# Patient Record
Sex: MALE | Race: WHITE | NOT HISPANIC OR LATINO | Employment: FULL TIME | URBAN - METROPOLITAN AREA
[De-identification: names, ages, dates, MRNs, and addresses within clinical notes are randomized per-mention and may not be internally consistent; named-entity substitution may affect disease eponyms.]

---

## 2018-06-08 ENCOUNTER — APPOINTMENT (OUTPATIENT)
Dept: CARDIOLOGY | Facility: CLINIC | Age: 43
DRG: 247 | End: 2018-06-08
Attending: EMERGENCY MEDICINE
Payer: COMMERCIAL

## 2018-06-08 ENCOUNTER — APPOINTMENT (OUTPATIENT)
Dept: GENERAL RADIOLOGY | Facility: CLINIC | Age: 43
DRG: 247 | End: 2018-06-08
Attending: EMERGENCY MEDICINE
Payer: COMMERCIAL

## 2018-06-08 ENCOUNTER — HOSPITAL ENCOUNTER (INPATIENT)
Facility: CLINIC | Age: 43
LOS: 4 days | Discharge: HOME | DRG: 247 | End: 2018-06-12
Attending: EMERGENCY MEDICINE | Admitting: INTERNAL MEDICINE
Payer: COMMERCIAL

## 2018-06-08 ENCOUNTER — APPOINTMENT (OUTPATIENT)
Dept: CARDIOLOGY | Facility: CLINIC | Age: 43
DRG: 247 | End: 2018-06-08
Attending: INTERNAL MEDICINE
Payer: COMMERCIAL

## 2018-06-08 DIAGNOSIS — I21.4 NSTEMI (NON-ST ELEVATED MYOCARDIAL INFARCTION) (H): ICD-10-CM

## 2018-06-08 DIAGNOSIS — I25.10 CAD (CORONARY ARTERY DISEASE): ICD-10-CM

## 2018-06-08 DIAGNOSIS — I21.02 ACUTE ST ELEVATION MYOCARDIAL INFARCTION (STEMI) INVOLVING LEFT ANTERIOR DESCENDING (LAD) CORONARY ARTERY (H): Primary | ICD-10-CM

## 2018-06-08 PROBLEM — I21.9 AMI (ACUTE MYOCARDIAL INFARCTION) (H): Status: ACTIVE | Noted: 2018-06-08

## 2018-06-08 LAB
ANION GAP SERPL CALCULATED.3IONS-SCNC: 4 MMOL/L (ref 3–14)
APTT PPP: 30 SEC (ref 22–37)
BASOPHILS # BLD AUTO: 0 10E9/L (ref 0–0.2)
BASOPHILS NFR BLD AUTO: 0.4 %
BUN SERPL-MCNC: 10 MG/DL (ref 7–30)
CALCIUM SERPL-MCNC: 9 MG/DL (ref 8.5–10.1)
CHLORIDE SERPL-SCNC: 108 MMOL/L (ref 94–109)
CO2 SERPL-SCNC: 29 MMOL/L (ref 20–32)
CREAT SERPL-MCNC: 0.86 MG/DL (ref 0.66–1.25)
DIFFERENTIAL METHOD BLD: NORMAL
EOSINOPHIL # BLD AUTO: 0.1 10E9/L (ref 0–0.7)
EOSINOPHIL NFR BLD AUTO: 1 %
ERYTHROCYTE [DISTWIDTH] IN BLOOD BY AUTOMATED COUNT: 12.1 % (ref 10–15)
GFR SERPL CREATININE-BSD FRML MDRD: >90 ML/MIN/1.7M2
GLUCOSE SERPL-MCNC: 90 MG/DL (ref 70–99)
HCT VFR BLD AUTO: 46.2 % (ref 40–53)
HGB BLD-MCNC: 16 G/DL (ref 13.3–17.7)
IMM GRANULOCYTES # BLD: 0 10E9/L (ref 0–0.4)
IMM GRANULOCYTES NFR BLD: 0.4 %
INR PPP: 0.92 (ref 0.86–1.14)
INTERPRETATION ECG - MUSE: NORMAL
INTERPRETATION ECG - MUSE: NORMAL
LYMPHOCYTES # BLD AUTO: 1.6 10E9/L (ref 0.8–5.3)
LYMPHOCYTES NFR BLD AUTO: 22.2 %
MCH RBC QN AUTO: 31.9 PG (ref 26.5–33)
MCHC RBC AUTO-ENTMCNC: 34.6 G/DL (ref 31.5–36.5)
MCV RBC AUTO: 92 FL (ref 78–100)
MONOCYTES # BLD AUTO: 0.5 10E9/L (ref 0–1.3)
MONOCYTES NFR BLD AUTO: 7.4 %
NEUTROPHILS # BLD AUTO: 4.8 10E9/L (ref 1.6–8.3)
NEUTROPHILS NFR BLD AUTO: 68.6 %
NRBC # BLD AUTO: 0 10*3/UL
NRBC BLD AUTO-RTO: 0 /100
PLATELET # BLD AUTO: 215 10E9/L (ref 150–450)
POTASSIUM SERPL-SCNC: 3.7 MMOL/L (ref 3.4–5.3)
RBC # BLD AUTO: 5.01 10E12/L (ref 4.4–5.9)
SODIUM SERPL-SCNC: 141 MMOL/L (ref 133–144)
TROPONIN I SERPL-MCNC: 0.07 UG/L (ref 0–0.04)
WBC # BLD AUTO: 7 10E9/L (ref 4–11)

## 2018-06-08 PROCEDURE — 93571 IV DOP VEL&/PRESS C FLO 1ST: CPT

## 2018-06-08 PROCEDURE — 85610 PROTHROMBIN TIME: CPT | Performed by: EMERGENCY MEDICINE

## 2018-06-08 PROCEDURE — 71046 X-RAY EXAM CHEST 2 VIEWS: CPT

## 2018-06-08 PROCEDURE — 40000264 ECHO COMPLETE WITH OPTISON

## 2018-06-08 PROCEDURE — 4A033BC MEASUREMENT OF ARTERIAL PRESSURE, CORONARY, PERCUTANEOUS APPROACH: ICD-10-PCS | Performed by: INTERNAL MEDICINE

## 2018-06-08 PROCEDURE — C1894 INTRO/SHEATH, NON-LASER: HCPCS

## 2018-06-08 PROCEDURE — 99223 1ST HOSP IP/OBS HIGH 75: CPT | Mod: AI | Performed by: INTERNAL MEDICINE

## 2018-06-08 PROCEDURE — C1769 GUIDE WIRE: HCPCS

## 2018-06-08 PROCEDURE — 27210856 ZZH ACCESS HEART CATH CR2

## 2018-06-08 PROCEDURE — 99152 MOD SED SAME PHYS/QHP 5/>YRS: CPT

## 2018-06-08 PROCEDURE — 99291 CRITICAL CARE FIRST HOUR: CPT | Mod: 25 | Performed by: INTERNAL MEDICINE

## 2018-06-08 PROCEDURE — 25000128 H RX IP 250 OP 636: Performed by: EMERGENCY MEDICINE

## 2018-06-08 PROCEDURE — 25000132 ZZH RX MED GY IP 250 OP 250 PS 637: Performed by: INTERNAL MEDICINE

## 2018-06-08 PROCEDURE — 96366 THER/PROPH/DIAG IV INF ADDON: CPT

## 2018-06-08 PROCEDURE — 27210827 ZZH KIT ACIST INJECTOR CR6

## 2018-06-08 PROCEDURE — 25000125 ZZHC RX 250: Performed by: INTERNAL MEDICINE

## 2018-06-08 PROCEDURE — 93306 TTE W/DOPPLER COMPLETE: CPT | Mod: 26 | Performed by: INTERNAL MEDICINE

## 2018-06-08 PROCEDURE — 93005 ELECTROCARDIOGRAM TRACING: CPT

## 2018-06-08 PROCEDURE — 12000007 ZZH R&B INTERMEDIATE

## 2018-06-08 PROCEDURE — 27210742 ZZH CATH CR1

## 2018-06-08 PROCEDURE — 93571 IV DOP VEL&/PRESS C FLO 1ST: CPT | Mod: 26 | Performed by: INTERNAL MEDICINE

## 2018-06-08 PROCEDURE — 85025 COMPLETE CBC W/AUTO DIFF WBC: CPT | Performed by: EMERGENCY MEDICINE

## 2018-06-08 PROCEDURE — 84484 ASSAY OF TROPONIN QUANT: CPT | Performed by: EMERGENCY MEDICINE

## 2018-06-08 PROCEDURE — 80048 BASIC METABOLIC PNL TOTAL CA: CPT | Performed by: EMERGENCY MEDICINE

## 2018-06-08 PROCEDURE — 25000128 H RX IP 250 OP 636: Performed by: INTERNAL MEDICINE

## 2018-06-08 PROCEDURE — 99285 EMERGENCY DEPT VISIT HI MDM: CPT | Mod: 25

## 2018-06-08 PROCEDURE — 12000000 ZZH R&B MED SURG/OB

## 2018-06-08 PROCEDURE — C1887 CATHETER, GUIDING: HCPCS

## 2018-06-08 PROCEDURE — 85730 THROMBOPLASTIN TIME PARTIAL: CPT | Performed by: EMERGENCY MEDICINE

## 2018-06-08 PROCEDURE — B2111ZZ FLUOROSCOPY OF MULTIPLE CORONARY ARTERIES USING LOW OSMOLAR CONTRAST: ICD-10-PCS | Performed by: INTERNAL MEDICINE

## 2018-06-08 PROCEDURE — 99152 MOD SED SAME PHYS/QHP 5/>YRS: CPT | Performed by: INTERNAL MEDICINE

## 2018-06-08 PROCEDURE — 93458 L HRT ARTERY/VENTRICLE ANGIO: CPT | Mod: 26 | Performed by: INTERNAL MEDICINE

## 2018-06-08 PROCEDURE — 96365 THER/PROPH/DIAG IV INF INIT: CPT

## 2018-06-08 PROCEDURE — 4A023N7 MEASUREMENT OF CARDIAC SAMPLING AND PRESSURE, LEFT HEART, PERCUTANEOUS APPROACH: ICD-10-PCS | Performed by: INTERNAL MEDICINE

## 2018-06-08 PROCEDURE — 27210946 ZZH KIT HC TOTES DISP CR8

## 2018-06-08 PROCEDURE — 25500064 ZZH RX 255 OP 636: Performed by: EMERGENCY MEDICINE

## 2018-06-08 PROCEDURE — 93458 L HRT ARTERY/VENTRICLE ANGIO: CPT

## 2018-06-08 PROCEDURE — 93005 ELECTROCARDIOGRAM TRACING: CPT | Mod: 76

## 2018-06-08 PROCEDURE — 27210998 ZZH ACCESS HEART CATH CR6

## 2018-06-08 PROCEDURE — B2151ZZ FLUOROSCOPY OF LEFT HEART USING LOW OSMOLAR CONTRAST: ICD-10-PCS | Performed by: INTERNAL MEDICINE

## 2018-06-08 PROCEDURE — 99153 MOD SED SAME PHYS/QHP EA: CPT

## 2018-06-08 RX ORDER — PROTAMINE SULFATE 10 MG/ML
1-5 INJECTION, SOLUTION INTRAVENOUS
Status: DISCONTINUED | OUTPATIENT
Start: 2018-06-08 | End: 2018-06-08 | Stop reason: HOSPADM

## 2018-06-08 RX ORDER — SODIUM NITROPRUSSIDE 25 MG/ML
100-200 INJECTION INTRAVENOUS
Status: DISCONTINUED | OUTPATIENT
Start: 2018-06-08 | End: 2018-06-08 | Stop reason: HOSPADM

## 2018-06-08 RX ORDER — CLOPIDOGREL BISULFATE 75 MG/1
75 TABLET ORAL DAILY
Status: DISCONTINUED | OUTPATIENT
Start: 2018-06-09 | End: 2018-06-12 | Stop reason: HOSPADM

## 2018-06-08 RX ORDER — FENTANYL CITRATE 50 UG/ML
INJECTION, SOLUTION INTRAMUSCULAR; INTRAVENOUS
Status: DISCONTINUED
Start: 2018-06-08 | End: 2018-06-08 | Stop reason: HOSPADM

## 2018-06-08 RX ORDER — PROTAMINE SULFATE 10 MG/ML
25-100 INJECTION, SOLUTION INTRAVENOUS EVERY 5 MIN PRN
Status: DISCONTINUED | OUTPATIENT
Start: 2018-06-08 | End: 2018-06-08 | Stop reason: HOSPADM

## 2018-06-08 RX ORDER — NALOXONE HYDROCHLORIDE 0.4 MG/ML
.2-.4 INJECTION, SOLUTION INTRAMUSCULAR; INTRAVENOUS; SUBCUTANEOUS
Status: DISCONTINUED | OUTPATIENT
Start: 2018-06-08 | End: 2018-06-08

## 2018-06-08 RX ORDER — ATROPINE SULFATE 0.1 MG/ML
0.5 INJECTION INTRAVENOUS EVERY 5 MIN PRN
Status: DISCONTINUED | OUTPATIENT
Start: 2018-06-08 | End: 2018-06-08

## 2018-06-08 RX ORDER — ADENOSINE 3 MG/ML
12-12000 INJECTION, SOLUTION INTRAVENOUS
Status: DISCONTINUED | OUTPATIENT
Start: 2018-06-08 | End: 2018-06-08 | Stop reason: HOSPADM

## 2018-06-08 RX ORDER — CLOPIDOGREL BISULFATE 75 MG/1
75 TABLET ORAL
Status: DISCONTINUED | OUTPATIENT
Start: 2018-06-08 | End: 2018-06-08 | Stop reason: HOSPADM

## 2018-06-08 RX ORDER — ENALAPRILAT 1.25 MG/ML
1.25-2.5 INJECTION INTRAVENOUS
Status: DISCONTINUED | OUTPATIENT
Start: 2018-06-08 | End: 2018-06-08 | Stop reason: HOSPADM

## 2018-06-08 RX ORDER — ASPIRIN 81 MG/1
81 TABLET ORAL DAILY
Status: DISCONTINUED | OUTPATIENT
Start: 2018-06-09 | End: 2018-06-11 | Stop reason: ALTCHOICE

## 2018-06-08 RX ORDER — CLOPIDOGREL 300 MG/1
300-600 TABLET, FILM COATED ORAL
Status: COMPLETED | OUTPATIENT
Start: 2018-06-08 | End: 2018-06-08

## 2018-06-08 RX ORDER — NIFEDIPINE 10 MG/1
10 CAPSULE ORAL
Status: DISCONTINUED | OUTPATIENT
Start: 2018-06-08 | End: 2018-06-08 | Stop reason: HOSPADM

## 2018-06-08 RX ORDER — ONDANSETRON 2 MG/ML
4 INJECTION INTRAMUSCULAR; INTRAVENOUS EVERY 4 HOURS PRN
Status: DISCONTINUED | OUTPATIENT
Start: 2018-06-08 | End: 2018-06-08 | Stop reason: HOSPADM

## 2018-06-08 RX ORDER — LIDOCAINE 40 MG/G
CREAM TOPICAL
Status: DISCONTINUED | OUTPATIENT
Start: 2018-06-08 | End: 2018-06-08

## 2018-06-08 RX ORDER — DIPHENHYDRAMINE HYDROCHLORIDE 50 MG/ML
25-50 INJECTION INTRAMUSCULAR; INTRAVENOUS
Status: DISCONTINUED | OUTPATIENT
Start: 2018-06-08 | End: 2018-06-08 | Stop reason: HOSPADM

## 2018-06-08 RX ORDER — FUROSEMIDE 10 MG/ML
20-100 INJECTION INTRAMUSCULAR; INTRAVENOUS
Status: DISCONTINUED | OUTPATIENT
Start: 2018-06-08 | End: 2018-06-08 | Stop reason: HOSPADM

## 2018-06-08 RX ORDER — HYDROCODONE BITARTRATE AND ACETAMINOPHEN 5; 325 MG/1; MG/1
1-2 TABLET ORAL EVERY 4 HOURS PRN
Status: DISCONTINUED | OUTPATIENT
Start: 2018-06-08 | End: 2018-06-11

## 2018-06-08 RX ORDER — DOBUTAMINE HYDROCHLORIDE 200 MG/100ML
2-20 INJECTION INTRAVENOUS CONTINUOUS PRN
Status: DISCONTINUED | OUTPATIENT
Start: 2018-06-08 | End: 2018-06-08 | Stop reason: HOSPADM

## 2018-06-08 RX ORDER — ACETAMINOPHEN 325 MG/1
325-650 TABLET ORAL EVERY 4 HOURS PRN
Status: DISCONTINUED | OUTPATIENT
Start: 2018-06-08 | End: 2018-06-11

## 2018-06-08 RX ORDER — NITROGLYCERIN 5 MG/ML
VIAL (ML) INTRAVENOUS
Status: DISCONTINUED
Start: 2018-06-08 | End: 2018-06-08 | Stop reason: HOSPADM

## 2018-06-08 RX ORDER — POTASSIUM CHLORIDE 7.45 MG/ML
10 INJECTION INTRAVENOUS
Status: DISCONTINUED | OUTPATIENT
Start: 2018-06-08 | End: 2018-06-08 | Stop reason: HOSPADM

## 2018-06-08 RX ORDER — NALOXONE HYDROCHLORIDE 0.4 MG/ML
0.4 INJECTION, SOLUTION INTRAMUSCULAR; INTRAVENOUS; SUBCUTANEOUS EVERY 5 MIN PRN
Status: DISCONTINUED | OUTPATIENT
Start: 2018-06-08 | End: 2018-06-08 | Stop reason: HOSPADM

## 2018-06-08 RX ORDER — METOPROLOL TARTRATE 25 MG/1
25 TABLET, FILM COATED ORAL 2 TIMES DAILY
Status: DISCONTINUED | OUTPATIENT
Start: 2018-06-08 | End: 2018-06-09

## 2018-06-08 RX ORDER — NICARDIPINE HYDROCHLORIDE 2.5 MG/ML
100 INJECTION INTRAVENOUS
Status: DISCONTINUED | OUTPATIENT
Start: 2018-06-08 | End: 2018-06-08 | Stop reason: HOSPADM

## 2018-06-08 RX ORDER — PHENYLEPHRINE HCL IN 0.9% NACL 1 MG/10 ML
20-100 SYRINGE (ML) INTRAVENOUS
Status: DISCONTINUED | OUTPATIENT
Start: 2018-06-08 | End: 2018-06-08 | Stop reason: HOSPADM

## 2018-06-08 RX ORDER — SODIUM CHLORIDE 9 MG/ML
INJECTION, SOLUTION INTRAVENOUS CONTINUOUS
Status: DISCONTINUED | OUTPATIENT
Start: 2018-06-08 | End: 2018-06-09

## 2018-06-08 RX ORDER — VERAPAMIL HYDROCHLORIDE 2.5 MG/ML
INJECTION, SOLUTION INTRAVENOUS
Status: DISCONTINUED
Start: 2018-06-08 | End: 2018-06-08 | Stop reason: HOSPADM

## 2018-06-08 RX ORDER — NITROGLYCERIN 20 MG/100ML
.07-2 INJECTION INTRAVENOUS CONTINUOUS PRN
Status: DISCONTINUED | OUTPATIENT
Start: 2018-06-08 | End: 2018-06-08 | Stop reason: HOSPADM

## 2018-06-08 RX ORDER — FLUMAZENIL 0.1 MG/ML
0.2 INJECTION, SOLUTION INTRAVENOUS
Status: DISCONTINUED | OUTPATIENT
Start: 2018-06-08 | End: 2018-06-08 | Stop reason: HOSPADM

## 2018-06-08 RX ORDER — EPINEPHRINE 1 MG/ML
0.3 INJECTION, SOLUTION, CONCENTRATE INTRAVENOUS
Status: DISCONTINUED | OUTPATIENT
Start: 2018-06-08 | End: 2018-06-08 | Stop reason: HOSPADM

## 2018-06-08 RX ORDER — LORAZEPAM 2 MG/ML
.5-2 INJECTION INTRAMUSCULAR EVERY 4 HOURS PRN
Status: DISCONTINUED | OUTPATIENT
Start: 2018-06-08 | End: 2018-06-08 | Stop reason: HOSPADM

## 2018-06-08 RX ORDER — POTASSIUM CHLORIDE 29.8 MG/ML
20 INJECTION INTRAVENOUS
Status: DISCONTINUED | OUTPATIENT
Start: 2018-06-08 | End: 2018-06-08 | Stop reason: HOSPADM

## 2018-06-08 RX ORDER — MORPHINE SULFATE 2 MG/ML
1-2 INJECTION, SOLUTION INTRAMUSCULAR; INTRAVENOUS EVERY 5 MIN PRN
Status: DISCONTINUED | OUTPATIENT
Start: 2018-06-08 | End: 2018-06-08 | Stop reason: HOSPADM

## 2018-06-08 RX ORDER — HYDRALAZINE HYDROCHLORIDE 20 MG/ML
10-20 INJECTION INTRAMUSCULAR; INTRAVENOUS
Status: DISCONTINUED | OUTPATIENT
Start: 2018-06-08 | End: 2018-06-08 | Stop reason: HOSPADM

## 2018-06-08 RX ORDER — METOPROLOL TARTRATE 1 MG/ML
5 INJECTION, SOLUTION INTRAVENOUS EVERY 5 MIN PRN
Status: DISCONTINUED | OUTPATIENT
Start: 2018-06-08 | End: 2018-06-08 | Stop reason: HOSPADM

## 2018-06-08 RX ORDER — LISINOPRIL 2.5 MG/1
2.5 TABLET ORAL DAILY
Status: DISCONTINUED | OUTPATIENT
Start: 2018-06-08 | End: 2018-06-09

## 2018-06-08 RX ORDER — ATORVASTATIN CALCIUM 40 MG/1
40 TABLET, FILM COATED ORAL DAILY
Status: DISCONTINUED | OUTPATIENT
Start: 2018-06-08 | End: 2018-06-12 | Stop reason: HOSPADM

## 2018-06-08 RX ORDER — ATROPINE SULFATE 0.1 MG/ML
.5-1 INJECTION INTRAVENOUS
Status: DISCONTINUED | OUTPATIENT
Start: 2018-06-08 | End: 2018-06-08 | Stop reason: HOSPADM

## 2018-06-08 RX ORDER — HEPARIN SODIUM 1000 [USP'U]/ML
1000-10000 INJECTION, SOLUTION INTRAVENOUS; SUBCUTANEOUS EVERY 5 MIN PRN
Status: DISCONTINUED | OUTPATIENT
Start: 2018-06-08 | End: 2018-06-08 | Stop reason: HOSPADM

## 2018-06-08 RX ORDER — LIDOCAINE HYDROCHLORIDE 10 MG/ML
1-10 INJECTION, SOLUTION EPIDURAL; INFILTRATION; INTRACAUDAL; PERINEURAL
Status: COMPLETED | OUTPATIENT
Start: 2018-06-08 | End: 2018-06-08

## 2018-06-08 RX ORDER — DEXTROSE MONOHYDRATE 25 G/50ML
12.5-5 INJECTION, SOLUTION INTRAVENOUS EVERY 30 MIN PRN
Status: DISCONTINUED | OUTPATIENT
Start: 2018-06-08 | End: 2018-06-08 | Stop reason: HOSPADM

## 2018-06-08 RX ORDER — NALOXONE HYDROCHLORIDE 0.4 MG/ML
.1-.4 INJECTION, SOLUTION INTRAMUSCULAR; INTRAVENOUS; SUBCUTANEOUS
Status: DISCONTINUED | OUTPATIENT
Start: 2018-06-08 | End: 2018-06-11

## 2018-06-08 RX ORDER — DOPAMINE HYDROCHLORIDE 160 MG/100ML
2-20 INJECTION, SOLUTION INTRAVENOUS CONTINUOUS PRN
Status: DISCONTINUED | OUTPATIENT
Start: 2018-06-08 | End: 2018-06-08 | Stop reason: HOSPADM

## 2018-06-08 RX ORDER — LIDOCAINE HYDROCHLORIDE 10 MG/ML
INJECTION, SOLUTION EPIDURAL; INFILTRATION; INTRACAUDAL; PERINEURAL
Status: DISCONTINUED
Start: 2018-06-08 | End: 2018-06-08 | Stop reason: HOSPADM

## 2018-06-08 RX ORDER — METHYLPREDNISOLONE SODIUM SUCCINATE 125 MG/2ML
125 INJECTION, POWDER, LYOPHILIZED, FOR SOLUTION INTRAMUSCULAR; INTRAVENOUS
Status: DISCONTINUED | OUTPATIENT
Start: 2018-06-08 | End: 2018-06-08 | Stop reason: HOSPADM

## 2018-06-08 RX ORDER — LIDOCAINE HYDROCHLORIDE 10 MG/ML
30 INJECTION, SOLUTION EPIDURAL; INFILTRATION; INTRACAUDAL; PERINEURAL
Status: DISCONTINUED | OUTPATIENT
Start: 2018-06-08 | End: 2018-06-08 | Stop reason: HOSPADM

## 2018-06-08 RX ORDER — FENTANYL CITRATE 50 UG/ML
25-50 INJECTION, SOLUTION INTRAMUSCULAR; INTRAVENOUS
Status: DISCONTINUED | OUTPATIENT
Start: 2018-06-08 | End: 2018-06-08

## 2018-06-08 RX ORDER — BUPIVACAINE HYDROCHLORIDE 2.5 MG/ML
1-10 INJECTION, SOLUTION EPIDURAL; INFILTRATION; INTRACAUDAL
Status: DISCONTINUED | OUTPATIENT
Start: 2018-06-08 | End: 2018-06-08 | Stop reason: HOSPADM

## 2018-06-08 RX ORDER — VERAPAMIL HYDROCHLORIDE 2.5 MG/ML
1-2.5 INJECTION, SOLUTION INTRAVENOUS
Status: COMPLETED | OUTPATIENT
Start: 2018-06-08 | End: 2018-06-08

## 2018-06-08 RX ORDER — NITROGLYCERIN 5 MG/ML
100-500 VIAL (ML) INTRAVENOUS
Status: COMPLETED | OUTPATIENT
Start: 2018-06-08 | End: 2018-06-08

## 2018-06-08 RX ORDER — PRASUGREL 10 MG/1
10-60 TABLET, FILM COATED ORAL
Status: DISCONTINUED | OUTPATIENT
Start: 2018-06-08 | End: 2018-06-08 | Stop reason: HOSPADM

## 2018-06-08 RX ORDER — FENTANYL CITRATE 50 UG/ML
25-50 INJECTION, SOLUTION INTRAMUSCULAR; INTRAVENOUS
Status: DISCONTINUED | OUTPATIENT
Start: 2018-06-08 | End: 2018-06-08 | Stop reason: HOSPADM

## 2018-06-08 RX ORDER — FLUMAZENIL 0.1 MG/ML
0.2 INJECTION, SOLUTION INTRAVENOUS
Status: DISCONTINUED | OUTPATIENT
Start: 2018-06-08 | End: 2018-06-08

## 2018-06-08 RX ORDER — NITROGLYCERIN 0.4 MG/1
0.4 TABLET SUBLINGUAL EVERY 5 MIN PRN
Status: DISCONTINUED | OUTPATIENT
Start: 2018-06-08 | End: 2018-06-08 | Stop reason: HOSPADM

## 2018-06-08 RX ORDER — ADENOSINE 3 MG/ML
INJECTION, SOLUTION INTRAVENOUS
Status: DISCONTINUED
Start: 2018-06-08 | End: 2018-06-08 | Stop reason: HOSPADM

## 2018-06-08 RX ORDER — HEPARIN SODIUM 1000 [USP'U]/ML
INJECTION, SOLUTION INTRAVENOUS; SUBCUTANEOUS
Status: DISCONTINUED
Start: 2018-06-08 | End: 2018-06-08 | Stop reason: HOSPADM

## 2018-06-08 RX ORDER — ASPIRIN 325 MG
325 TABLET ORAL
Status: DISCONTINUED | OUTPATIENT
Start: 2018-06-08 | End: 2018-06-08 | Stop reason: HOSPADM

## 2018-06-08 RX ORDER — CLOPIDOGREL 300 MG/1
TABLET, FILM COATED ORAL
Status: DISCONTINUED
Start: 2018-06-08 | End: 2018-06-08 | Stop reason: HOSPADM

## 2018-06-08 RX ORDER — NITROGLYCERIN 5 MG/ML
100-200 VIAL (ML) INTRAVENOUS
Status: DISCONTINUED | OUTPATIENT
Start: 2018-06-08 | End: 2018-06-08 | Stop reason: HOSPADM

## 2018-06-08 RX ORDER — ASPIRIN 81 MG/1
81-324 TABLET, CHEWABLE ORAL
Status: DISCONTINUED | OUTPATIENT
Start: 2018-06-08 | End: 2018-06-08 | Stop reason: HOSPADM

## 2018-06-08 RX ORDER — IOPAMIDOL 755 MG/ML
100 INJECTION, SOLUTION INTRAVASCULAR ONCE
Status: COMPLETED | OUTPATIENT
Start: 2018-06-08 | End: 2018-06-08

## 2018-06-08 RX ADMIN — CLOPIDOGREL BISULFATE 300 MG: 300 TABLET, FILM COATED ORAL at 15:37

## 2018-06-08 RX ADMIN — LISINOPRIL 2.5 MG: 2.5 TABLET ORAL at 17:00

## 2018-06-08 RX ADMIN — HEPARIN SODIUM 1000 UNITS/HR: 10000 INJECTION, SOLUTION INTRAVENOUS at 12:46

## 2018-06-08 RX ADMIN — ADENOSINE 60 MCG: 3 INJECTION, SOLUTION INTRAVENOUS at 15:32

## 2018-06-08 RX ADMIN — MIDAZOLAM 1 MG: 1 INJECTION INTRAMUSCULAR; INTRAVENOUS at 15:13

## 2018-06-08 RX ADMIN — METOPROLOL TARTRATE 25 MG: 25 TABLET ORAL at 20:34

## 2018-06-08 RX ADMIN — ADENOSINE 120 MCG: 3 INJECTION, SOLUTION INTRAVENOUS at 15:33

## 2018-06-08 RX ADMIN — IOPAMIDOL 156 ML: 755 INJECTION, SOLUTION INTRAVASCULAR at 14:32

## 2018-06-08 RX ADMIN — ATORVASTATIN CALCIUM 40 MG: 40 TABLET, FILM COATED ORAL at 16:59

## 2018-06-08 RX ADMIN — LIDOCAINE HYDROCHLORIDE 1 ML: 10 INJECTION, SOLUTION EPIDURAL; INFILTRATION; INTRACAUDAL; PERINEURAL at 15:09

## 2018-06-08 RX ADMIN — HUMAN ALBUMIN MICROSPHERES AND PERFLUTREN 3 ML: 10; .22 INJECTION, SOLUTION INTRAVENOUS at 11:32

## 2018-06-08 RX ADMIN — HEPARIN SODIUM 10000 UNITS: 1000 INJECTION, SOLUTION INTRAVENOUS; SUBCUTANEOUS at 15:10

## 2018-06-08 RX ADMIN — Medication 5000 UNITS: at 12:47

## 2018-06-08 RX ADMIN — SODIUM CHLORIDE: 9 INJECTION, SOLUTION INTRAVENOUS at 16:56

## 2018-06-08 RX ADMIN — FENTANYL CITRATE 50 MCG: 50 INJECTION INTRAMUSCULAR; INTRAVENOUS at 15:12

## 2018-06-08 RX ADMIN — VERAPAMIL HYDROCHLORIDE 2.5 MG: 2.5 INJECTION, SOLUTION INTRAVENOUS at 15:11

## 2018-06-08 RX ADMIN — NITROGLYCERIN 200 MCG: 5 INJECTION, SOLUTION INTRAVENOUS at 15:12

## 2018-06-08 RX ADMIN — HEPARIN SODIUM 5000 UNITS: 1000 INJECTION, SOLUTION INTRAVENOUS; SUBCUTANEOUS at 15:20

## 2018-06-08 ASSESSMENT — ENCOUNTER SYMPTOMS
ABDOMINAL PAIN: 0
FEVER: 0
NAUSEA: 0
CONSTIPATION: 0
DIARRHEA: 0
VOMITING: 0
SHORTNESS OF BREATH: 0
COUGH: 0
FATIGUE: 0

## 2018-06-08 ASSESSMENT — ACTIVITIES OF DAILY LIVING (ADL): ADLS_ACUITY_SCORE: 9

## 2018-06-08 NOTE — CONSULTS
Consult Date:  06/08/2018      CARDIOLOGY CONSULTATION      REASON FOR CARDIOLOGY CONSULTATION:  Chest pain with abnormal EKG.      REQUESTING PHYSICIAN:  Dr. Gimenez      REASON FOR CONSULTATION:  Chest pain.      HISTORY OF PRESENT ILLNESS:  Mr. Lainez is a very pleasant 42-year-old gentleman with no significant past medical history or past cardiac history who has been having chest discomfort for the last 1 week.  This is intermittent in nature.  This feels like a moderate intensity ache in the chest area, sometimes with sweating.  This lasts for about 10-15 minutes and goes away on its own.  He tried Tums without any relief.  He has not noticed any particular relationship with exertion.  He presented to the clinic today as he was planning to leave for [place].  He was subsequently sent to the ER where he had serial EKGs done.  The first EKG done at 9:56 a.m. showed T-wave inversion from lead V1-V4 sinus rhythm and mild T-wave inversion in lead III.  Second EKG was done at 10:38 that showed ST elevation in lead V3 about 1 mm and slightly under 1 mm ST elevation in lead V2.  There is T-wave inversion from lead V1-V4.  The last EKG I have is from 12:11 p.m. that shows 1 mm ST elevation in lead V3, otherwise only T-wave inversion from lead V1-V5.        The patient is chest pain-free at this time.  In fact, his last chest pain happened 3:00 p.m. yesterday.  He does not use any tobacco.  There is no family history of premature coronary artery disease in first-degree relatives.  The patient does not have any known history of hypertension, dyslipidemia and diabetes.  He had an echocardiogram done today that shows a septal wall motion that could be due to conduction system disease, otherwise normal LV function.  His initial troponin is 0.071.      REVIEW OF SYSTEMS:  A complete review of systems was performed and was negative except as in the HPI.      PAST MEDICAL HISTORY:  None.      SOCIAL HISTORY:  uses  tobacco -  BP Readings from Last 5 Encounters:   06/08/18 131/89   -1 ppd x 30 years or so.      FAMILY HISTORY:  No family history of premature coronary artery disease in first-degree relatives.      CURRENT MEDICATIONS:  Heparin drip, aspirin 324 mg daily.  Should be noted he got aspirin 324 mg once in the clinic before transfer to the ER.        PHYSICAL EXAMINATION:   VITAL SIGNS:  Blood pressure 157/110, heart rate 74, regular, respiratory rate 16, 100% oxygen on room air.   GENERAL:  The patient appears pleasant, comfortable.   NECK:  Normal JVP, no bruits.   CARDIOVASCULAR:  S1, S2 normal, no murmur, rub or gallop.   RESPIRATORY:  Clear to auscultation bilaterally.   ABDOMEN:  Soft, nontender.   EXTREMITIES:  No pitting pedal edema.   NEUROLOGIC:  Alert and oriented x3.   PSYCHIATRIC:  Normal affect.     SKIN:  No obvious rash.   HEENT:  No pallor or icterus.      PERTINENT LABORATORY AND IMAGING:  Initial troponin 0.07.  CBC and BMP essentially normal.  Chest x-ray no mediastinal widening.  Echocardiogram shows LVEF of 55-60% with some septal wall abnormality.      ASSESSMENT AND PLAN:  Pleasant 42-year-old gentleman who has been having intermittent stuttering chest discomfort for the last 5-7 days accompanied by sweating, now had a significantly abnormal EKG which is highly suggestive of LAD ischemia.  These are dynamic EKG changes with deep T-wave inversion in lead V1-V5 and some intermittent ST changes.  The last EKG shows ST elevation of 1 mm only in lead V3.      I had a long discussion with the patient regarding the nature of his symptoms and abnormal EKG and slightly elevated troponin.  These changes are highly concerning for a stuttering myocardial infarction.  We discussed the option of medical management versus risks and benefits of coronary angiogram including but not limited to risk of stroke, MI, death, need for PRBC transfusion , emergent bypass surgery.  The patient wishes to proceed with  coronary angiogram.  He understands the rationale of coronary angiogram and the risks involved.  I have already informed the Cath Lab.  They are doing the case and he will be taken next.  Fortunately, he is symptom free at this time.  I agree with heparin.  I recommend adding Lipitor 40 mg daily, metoprolol tartrate 25 mg b.i.d. and lisinopril 2.5 mg daily.      Intermittent chest discomfort with significant dynamic EKG changes overall concerning for stuttering myocardial infarction.  He is presently symptom free.  Latest EKG shows 1 mm ST elevation in lead V3 and T-wave inversion from V1-V4 as noted above.  Normal LV function, septal wall motion abnormality.      RECOMMENDATIONS:   1.  Coronary angiogram with possible revascularization.   2.  Continue heparin, already received 324 mg of aspirin in the clinic today.   3.  Add lisinopril 2.5 mg daily.   4.  Metoprolol tartrate 25 mg b.i.d.   5.  Lipitor 40 mg daily.   6. Tobacco cessation     Thank you for involving me in the care of Mr. Martínez.  Thirty-five minutes of critical care time spent managing acute coronary syndrome and coordinating care.         EDEN DOHERTY MD             D: 2018   T: 2018   MT: MARTI      Name:     LAKESHA MARTÍNEZ   MRN:      [id number]        Account:       [id number]   :      1975           Consult Date:  2018      Document: [id number]

## 2018-06-08 NOTE — CONSULTS
Cardiology consult dictated (#[id number])    Chest pain intermittent for last 5-7 days with sweating  EKG-significant dynamic changes with TWI in leads v1-v4 and ST changes- latest EKG- TWI v1-v4 and ST elevation 1 mm lead V3. Presently pain free for last several hours, mild troponin elevation and echo shows normal LVEF  Stuttering MI  Recommendations   - coronary angiogram  - agree with asa, heparin gtt  - add lipitor, bb,ace-I    D/w patient/cath lab and ER staff.

## 2018-06-08 NOTE — IP AVS SNAPSHOT
[facility] Intensive Care Unit    [address]    [facility] [address]    Phone:  [phone]    Fax:  [phone]                                       After Visit Summary   6/8/2018    Jeremy Lainez    MRN: [id number]           After Visit Summary Signature Page     I have received my discharge instructions, and my questions have been answered. I have discussed any challenges I see with this plan with the nurse or doctor.    ..........................................................................................................................................  Patient/Patient Representative Signature      ..........................................................................................................................................  Patient Representative Print Name and Relationship to Patient    ..................................................               ................................................  Date                                            Time    ..........................................................................................................................................  Reviewed by Signature/Title    ...................................................              ..............................................  Date                                                            Time

## 2018-06-08 NOTE — H&P
History and Physical     Jeremy Lainez MRN# [id number]   YOB: 1975 Age: 42 year old      Date of Admission:  6/8/2018    Primary care provider: Betty Ref-Primary, Physician          Assessment and Plan:   Jeremy Lainez is a 42 year old male without significant PMH, or strong family hx of CAD, who presents with 10 days of intermittent substernal chest pain not always associated with exertion.  However workup in the emergency room reveals slightly elevated troponin as well as a abnormal EKG with dynamic changes with T-wave inversions in lead V1 and 5 concerning for LAD ischemia.  He has been started on heparin drip and is planning for cardiac angiography this afternoon.  He is being admitted to the hospital for further evaluation and management.    1.  Substernal chest pain with dynamic EKG changes elevated troponin concerning for ACS.  Patient has been seen by cardiology and has been started on heparin drip.  His initial echocardiogram shows normal EF and no significant regional wall motion abnormality.  Currently he is pain-free he will proceed to coronary angiography later this afternoon.  His only risk factor is smoking in which smoking cessation should be counseled.  In the meantime he has been started on lisinopril, beta blocker as well as a statin.  Will check a fasting lipid panel in the morning.    2.  Hypertension-blood pressure elevated in the emergency room could be related to stress.  No formal diagnosis of hypertension though.  Continue metoprolol 25 mg twice daily and 2.5  mg of lisinopril.  Will need outpatient follow-up.    3.  Tobacco abuse-smoking cessation   4.  DVT prophylaxis-SCDs   5.  Admit to inpatient status    Hospitalist addendum:  Results reviewed.  Patient has moderate proximal LAD stenosis.  Likely unstable plaque rupture causing ACS/non-ST elevation MI.  We will need aggressive medical management, and lifestyle modification.  Continue to monitor chest pain symptoms  overnight.  Monitor blood pressure with initiation of new medications.              Chief Complaint:   Substernal chest pain         History of Present Illness:   Jeremy Lainez is a 42 year old male who presents with 10 day history of intermittent substernal chest pain. History is obtained by speaking with the ER physician patient interview and chart review.  Gavino is a fairly healthy 42-year-old gentleman with no known past medical history.  He denies any history of hypertension hyperlipidemia or diabetes.  He also denies any significant cardiac history in his family.  He does admit to smoking at least 20-pack-year history and does drink alcohol 5 days out of the week (2-3 Mixed drinks).  He states that over the last 10 days or so he would have substernal chest discomfort that he describes as a soreness in his chest.  These would be occasionally accompanied by and some mild nausea.  He states that mostly these happened during the morning time when he gets to work.  They are not always associated with exertional activity.  He has never had this pain before.  He denies any pleuritic chest pain, or chest wall pain.  He denies any recent illness no fevers coughing, URI symptoms.  He was actually referred to the emergency room from [facility] because he wanted to be evaluated before he went to the [facility] tomorrow for camping.              Past Medical History:   None, denies any history of hypertension, hyperlipidemia, diabetes.  No significant cardiopulmonary illness.          Past Surgical History:   Review noncontributory          Social History:     Social History     Social History     Marital status:      Spouse name: N/A     Number of children: N/A     Years of education: N/A     Occupational History     Not on file.     Social History Main Topics     Smoking status:  Half to a full pack a day for over 20 years     Smokeless tobacco: Not on file     Alcohol use  2-3 mixed drinks 5 nights a week     Drug  "use: Not on file     Sexual activity: Not on file             Family History:   No history of premature coronary disease         Allergies:    No Known Allergies            Medications:     NONE       Review of Systems:   A Comprehensive greater than 10 system review of systems was carried out.  Pertinent positives and negatives are noted above.  Otherwise negative for contributory information.            Physical Exam:   Blood pressure 131/89, temperature 96.3  F (35.7  C), temperature source Oral, resp. rate 20, height 1.88 m (6' 2\"), weight 82.7 kg (182 lb 4.8 oz), SpO2 96 %.  Exam:  GENERAL:  Comfortable.  PSYCH: pleasant, oriented, No acute distress.  HEENT:  PERRLA. Normal conjunctiva, normal hearing, nasal mucosa and Oropharynx are normal.  NECK:  Supple, no neck vein distention, adenopathy or bruits, normal thyroid.  HEART:  Normal S1, S2 with no murmur, no pericardial rub, gallops or S3 or S4.  LUNGS:  Clear to auscultation, normal Respiratory effort. No wheezing, rales or ronchi.  ABDOMEN:  Soft, no hepatosplenomegaly, normal bowel sounds. Non-tender, non distended.   EXTREMITIES:  No pedal edema, +2 pulses bilateral and equal.  SKIN:  Dry to touch, No rash, wound or ulcerations.  NEUROLOGIC:  CN 2-12 intact, BL 5/5 symmetric upper and lower extremity strength, sensation is intact with no focal deficits.           Data:       Recent Labs  Lab 06/08/18  1046   WBC 7.0   HGB 16.0   HCT 46.2   MCV 92          Recent Labs  Lab 06/08/18  1046      POTASSIUM 3.7   CHLORIDE 108   CO2 29   ANIONGAP 4   GLC 90   BUN 10   CR 0.86   GFRESTIMATED >90   GFRESTBLACK >90   MARIZA 9.0       Recent Labs  Lab 06/08/18  1046   TROPI 0.071*     ECG:  @ 0956  Indication: Chest pain  Vent. Rate 68 bpm. ID interval 152 ms. QRS duration 90 ms. QT/QTc 420/446 ms. P-R-T axis 6 43 15.   NSR, marked T wave abnormality consider anterior ischemia, Abnormal ECG.   Read by Osmani Kim.      @ 2630  Indication: Chest " pain  Vent. Rate 65 bpm. ME interval 158 ms. QRS duration 92 ms. QT/QTc 424/440 ms. P-R-T axis 15 70 34.   NSR, marked T wave abnormality consider anterior ischemia, Abnormal ECG.    Read @ 1039 by Dr. Joshi.      @ 1211  Indication: Repeat  Vent. Rate 55 bpm. ME interval 162 ms. QRS duration 90 ms. QT/QTc 456/436 ms. P-R-T axis 3 61 14.   Sinus bradycardia, marked T wave abnormality consider anterolateral ischemia, Abnormal ECG.  No significant change when compared to previous ECG    Read @ 1213 by Dr. Joshi.     Results for orders placed or performed during the hospital encounter of 06/08/18   XR Chest 2 Views    Narrative    XR CHEST 2 VW 6/8/2018 11:44 AM    HISTORY: Chest pain.    COMPARISON: None.    FINDINGS: No airspace consolidation, pleural effusion or pneumothorax.  Normal heart size.      Impression    IMPRESSION: No acute cardiopulmonary abnormality.    MD Temitope BARKER PA-C

## 2018-06-08 NOTE — PHARMACY-ADMISSION MEDICATION HISTORY
Admission medication history interview status for this patient is complete. See [facility] admission navigator for allergy information, prior to admission medications and immunization status.     Medication history interview source(s):Patient  Medication history resources (including written lists, pill bottles, clinic record):None  Primary pharmacy: [facility] [employer], [place]    Not taking any meds at home    Actions taken by pharmacist (provider contacted, etc):None     Additional medication history information:None    Medication reconciliation/reorder completed by provider prior to medication history? No    Do you take OTC medications (eg tylenol, ibuprofen, fish oil, eye/ear drops, etc)? N(Y/N)    For patients on insulin therapy: N (Y/N)    Prior to Admission medications    Not on File

## 2018-06-08 NOTE — DISCHARGE INSTRUCTIONS
Going Home after an Angioplasty  (Cardiac)  ______________________________________________    Patient Name: Jeremy Lainez  Date of Procedure: June 8, 2018    Doctor: Alan  After you go home:    Have an adult stay with you for 24 hours.    Drink plenty of fluids.    You may eat your normal diet, unless your doctor tells you otherwise.    For 24 hours:    Relax and take it easy.    Do NOT smoke.    Do NOT make any important or legal decisions.    Do NOT drive or operate machines at home or at work.    Do NOT drink alcohol.    Remove the Band-Aid after 24 hours. If there is minor oozing, apply another Band-aid and remove it after 12 hours.    For 2 days, do NOT have sex or do any heavy exercise.    Do NOT take a bath, or use a hot tub or pool for at least 3 days. You may shower.    Care of wrist or arm site  It is normal to have soreness at the puncture site and mild tingling in your hand for up to 3 days.    For 2 days, do not use your hand or arm to support your weight (such as rising from a chair) or bend your wrist (such as lifting a garage door).    For 2 days, do not lift more than 5 pounds or exercise your arm (tennis, golf or bowling).    If you start bleeding from the site in your arm:    Sit down and press firmly on the site with your fingers for 10 minutes. Call your doctor as soon as you can.    If the bleeding stops, sit still and keep your wrist straight for 2 hours.    Medicines    If you have started taking Plavix or Effient, do not stop taking it until you talk to your heart doctor (cardiologist).    If you are on metformin (Glucophage), do not restart it until you have blood tests (within 2 to 3 days after discharge). When your doctor tells you it is safe, you may restart the metformin.    If you have stopped any other medicines, check with your nurse or provider about when to restart them.    Call 911 right away if you have bleeding that is heavy or does not stop.    Call your doctor if:    You  have a large or growing hard lump around the site.    The site is red, swollen, hot or tender.    Blood or fluid is draining from the site.    You have chills or a fever greater than 101 F (38 C).    Your leg or arm feels numb or cool.    You have hives, a rash or unusual itching.      [facility] [place]:  [phone] (7 days a week)

## 2018-06-08 NOTE — IP AVS SNAPSHOT
MRN:[id number]                      After Visit Summary   6/8/2018    Jeremy Lainez    MRN: [id number]           Thank you!     Thank you for choosing [facility] for your care. Our goal is always to provide you with excellent care. Hearing back from our patients is one way we can continue to improve our services. Please take a few minutes to complete the written survey that you may receive in the mail after you visit. If you would like to speak to someone directly about your visit please contact Patient Relations at [phone]. Thank you!          Patient Information     Date Of Birth          1975        Designated Caregiver       Most Recent Value    Caregiver    Will someone help with your care after discharge? yes    Name of designated caregiver Oriana    Phone number of caregiver [phone]    Caregiver address same as patient      About your hospital stay     You were admitted on:  June 8, 2018 You last received care in the:  [facility]    You were discharged on:  June 12, 2018       Who to Call     For medical emergencies, please call 911.  For non-urgent questions about your medical care, please call your primary care provider or clinic, None          Attending Provider     Provider Specialty    Izaiah Gimenez MD Emergency Medicine    [facility]       Primary Care Provider Fax #    Physician No Ref-Primary [phone]      After Care Instructions     Activity       Your activity upon discharge: activity as tolerated            Diet       Follow this diet upon discharge: Orders Placed This Encounter      Low Saturated Fat Diet                  Follow-up Appointments     Follow-up and recommended labs and tests        Follow up with primary care provider, Physician No Ref-Primary, within 7 days to evaluate medication change and for hospital follow- up.  No follow up labs or test are needed.                   Your next 10 appointments already scheduled     Jun 18, 2018  1:00 PM CDT   Cardiac Evaluation with  Cardiac Rehab 2   [facility] ([facility])    [address], [address]  [facility] [address]   [phone]            Jun 26, 2018  7:30 AM CDT   LAB with RU LAB   [facility] ([facility])    [address] [place] [address] [address]  [facility] [id number] [phone]           Please do not eat 10-12 hours before your appointment if you are coming in fasting for labs on lipids, cholesterol, or glucose (sugar). This does not apply to pregnant women. Water, hot tea and black coffee (with nothing added) are okay. Do not drink other fluids, diet soda or chew gum.            Jun 26, 2018  8:30 AM CDT   Return Visit with Tye Bran PA-C   [facility] ([facility])    [address] [place] [address] [address]  [facility] [id number] [phone]            Aug 10, 2018  8:15 AM CDT   LAB with RU LAB   [facility] ([facility])    [address] [place] [address] [address]  [facility] [id number] [phone]           Please do not eat 10-12 hours before your appointment if you are coming in fasting for labs on lipids, cholesterol, or glucose (sugar). This does not apply to pregnant women. Water, hot tea and black coffee (with nothing added) are okay. Do not drink other fluids, diet soda or chew gum.            Aug 10, 2018  9:15 AM CDT   Return Visit with Ravindra Lopez MD   [facility] ([facility])    [address] [place] [address] [address]  [facility] [id number] [phone]              Additional Services     Cardiac Rehab Referral       *This therapy referral will be filtered to a centralized scheduling office at [facility] and the patient will receive a call to schedule an  "appointment at a [place] location most convenient for them.*     If you have not heard from the scheduling office within 2 business days, please call [phone] for all locations, with the exception of [place], please call [phone].    Please be aware that coverage of these services is subject to the terms and limitations of your health insurance plan.  Call member services at your health plan with any benefit or coverage questions.      **Note to Provider:  If you are referring outside of [place] for the therapy appointment, please list the name of the location in the \"special instructions\" above, print the referral and give to the patient to schedule the appointment.                   Follow-Up with Cardiac Advanced Practice Provider           Follow-Up with Cardiologist                 Future tests that were ordered for you     Basic metabolic panel           ALT       Last Lab Result: ALT (U/L)       Date                     Value                 06/09/2018               16               ----------            Lipid Profile                 Further instructions from your care team         Going Home after an Angioplasty  (Cardiac)  ______________________________________________    Patient Name: Jeremy Lainez  Date of Procedure: June 8, 2018    Doctor: Alan  After you go home:    Have an adult stay with you for 24 hours.    Drink plenty of fluids.    You may eat your normal diet, unless your doctor tells you otherwise.    For 24 hours:    Relax and take it easy.    Do NOT smoke.    Do NOT make any important or legal decisions.    Do NOT drive or operate machines at home or at work.    Do NOT drink alcohol.    Remove the Band-Aid after 24 hours. If there is minor oozing, apply another Band-aid and remove it after 12 hours.    For 2 days, do NOT have sex or do any heavy exercise.    Do NOT take a bath, or use a hot tub or pool for at least 3 days. You may shower.    Care of wrist or arm site  It is " normal to have soreness at the puncture site and mild tingling in your hand for up to 3 days.    For 2 days, do not use your hand or arm to support your weight (such as rising from a chair) or bend your wrist (such as lifting a garage door).    For 2 days, do not lift more than 5 pounds or exercise your arm (tennis, golf or bowling).    If you start bleeding from the site in your arm:    Sit down and press firmly on the site with your fingers for 10 minutes. Call your doctor as soon as you can.    If the bleeding stops, sit still and keep your wrist straight for 2 hours.    Medicines    If you have started taking Plavix or Effient, do not stop taking it until you talk to your heart doctor (cardiologist).    If you are on metformin (Glucophage), do not restart it until you have blood tests (within 2 to 3 days after discharge). When your doctor tells you it is safe, you may restart the metformin.    If you have stopped any other medicines, check with your nurse or provider about when to restart them.    Call 911 right away if you have bleeding that is heavy or does not stop.    Call your doctor if:    You have a large or growing hard lump around the site.    The site is red, swollen, hot or tender.    Blood or fluid is draining from the site.    You have chills or a fever greater than 101 F (38 C).    Your leg or arm feels numb or cool.    You have hives, a rash or unusual itching.      [facility] [place]:  [phone] (7 days a week)            Pending Results     No orders found from 6/6/2018 to 6/9/2018.            Statement of Approval     Ordered          06/12/18 1534  I have reviewed and agree with all the recommendations and orders detailed in this document.  EFFECTIVE NOW     Approved and electronically signed by:  Salvatore Johnson MD             Admission Information     Date & Time Provider Department Dept. Phone    6/8/2018 Saad Clifford MD [facility]  "Care Unit [phone]      Your Vitals Were     Blood Pressure Pulse Temperature Respirations Height Weight    102/62 58 98.3  F (36.8  C) (Oral) 12 1.88 m (6' 2\") 82.7 kg (182 lb 4.8 oz)    Pulse Oximetry BMI (Body Mass Index)                94% 23.41 kg/m2          Andel Information     Andel lets you send messages to your doctor, view your test results, renew your prescriptions, schedule appointments and more. To sign up, go to [email or web address] . Click on \"Log in\" on the left side of the screen, which will take you to the Welcome page. Then click on \"Sign up Now\" on the right side of the page.     You will be asked to enter the access code listed below, as well as some personal information. Please follow the directions to create your username and password.     Your access code is: [email or web address]  Expires: 9/10/2018  3:37 PM     Your access code will  in 90 days. If you need help or a new code, please call your [place] clinic or [phone].        Care EveryWhere ID     This is your Care EveryWhere ID. This could be used by other organizations to access your [place] medical records  [id number]        Equal Access to Services     CORNELIO GIBSON AH: Hadii sd tapia Soibrahima, waaxda luqadaha, qaybta kaalmada adeegyada, maggie jonas. So [facility] [phone].    ATENCIÓN: Si habla español, tiene a garza disposición servicios gratuitos de asistencia lingüística. Llame al [phone].    We comply with applicable federal civil rights laws and [place] laws. We do not discriminate on the basis of race, color, national origin, age, disability, sex, sexual orientation, or gender identity.               Review of your medicines      START taking        Dose / Directions    aspirin 81 MG EC tablet        Dose:  81 mg   Start taking on:  2018   Take 1 tablet (81 mg) by mouth daily   Refills:  0       atorvastatin 40 MG tablet   Commonly known as:  LIPITOR        Dose:  40 mg "   Take 1 tablet (40 mg) by mouth At Bedtime   Quantity:  30 tablet   Refills:  0       clopidogrel 75 MG tablet   Commonly known as:  PLAVIX        Dose:  75 mg   Start taking on:  6/13/2018   Take 1 tablet (75 mg) by mouth daily   Quantity:  30 tablet   Refills:  1       isosorbide mononitrate 30 MG 24 hr tablet   Commonly known as:  IMDUR        Dose:  30 mg   Start taking on:  6/13/2018   Take 1 tablet (30 mg) by mouth daily   Quantity:  30 tablet   Refills:  0            Where to get your medicines      These medications were sent to [facility] [address] [place] [facility] [address] [place] [address] [address]  [address] [facility], [facility] [address]     Phone:  [phone]     atorvastatin 40 MG tablet    clopidogrel 75 MG tablet    isosorbide mononitrate 30 MG 24 hr tablet         Some of these will need a paper prescription and others can be bought over the counter. Ask your nurse if you have questions.     You don't need a prescription for these medications     aspirin 81 MG EC tablet                Protect others around you: Learn how to safely use, store and throw away your medicines at www.disposemymeds.org.             Medication List: This is a list of all your medications and when to take them. Check marks below indicate your daily home schedule. Keep this list as a reference.      Medications           Morning Afternoon Evening Bedtime As Needed    aspirin 81 MG EC tablet   Take 1 tablet (81 mg) by mouth daily   Start taking on:  6/13/2018   Last time this was given:  81 mg on 6/12/2018  9:40 AM                                atorvastatin 40 MG tablet   Commonly known as:  LIPITOR   Take 1 tablet (40 mg) by mouth At Bedtime   Last time this was given:  40 mg on 6/12/2018  9:40 AM                                clopidogrel 75 MG tablet   Commonly known as:  PLAVIX   Take 1 tablet (75 mg) by mouth daily   Start taking on:  6/13/2018   Last time this was given:  75 mg on 6/12/2018  9:39 AM                                 isosorbide mononitrate 30 MG 24 hr tablet   Commonly known as:  IMDUR   Take 1 tablet (30 mg) by mouth daily   Start taking on:  6/13/2018   Last time this was given:  30 mg on 6/12/2018  1:32 PM                                          More Information        Patient Education    Metoprolol Succinate Oral tablet, extended-release    Metoprolol Tartrate Oral tablet    Metoprolol Tartrate Solution for injection  Metoprolol Tartrate Oral tablet  What is this medicine?  METOPROLOL (me TOE proe lole) is a beta-blocker. Beta-blockers reduce the workload on the heart and help it to beat more regularly. This medicine is used to treat high blood pressure and to prevent chest pain. It is also used to after a heart attack and to prevent an additional heart attack from occurring.  This medicine may be used for other purposes; ask your health care provider or pharmacist if you have questions.  What should I tell my health care provider before I take this medicine?  They need to know if you have any of these conditions:    diabetes    heart or vessel disease like slow heart rate, worsening heart failure, heart block, sick sinus syndrome or Raynaud's disease    kidney disease    liver disease    lung or breathing disease, like asthma or emphysema    pheochromocytoma    thyroid disease    an unusual or allergic reaction to metoprolol, other beta-blockers, medicines, foods, dyes, or preservatives    pregnant or trying to get pregnant    breast-feeding  How should I use this medicine?  Take this medicine by mouth with a drink of water. Follow the directions on the prescription label. Take this medicine immediately after meals. Take your doses at regular intervals. Do not take more medicine than directed. Do not stop taking this medicine suddenly. This could lead to serious heart-related effects.  Talk to your pediatrician regarding the use of this medicine in children. Special care may be needed.  Overdosage: If you think you have  taken too much of this medicine contact a poison control center or emergency room at once.  NOTE: This medicine is only for you. Do not share this medicine with others.  What if I miss a dose?  If you miss a dose, take it as soon as you can. If it is almost time for your next dose, take only that dose. Do not take double or extra doses.  What may interact with this medicine?  This medicine may interact with the following medications:    certain medicines for blood pressure, heart disease, irregular heart beat    certain medicines for depression like monoamine oxidase (MAO) inhibitors, fluoxetine, or paroxetine    clonidine    dobutamine    epinephrine    isoproterenol    reserpine  This list may not describe all possible interactions. Give your health care provider a list of all the medicines, herbs, non-prescription drugs, or dietary supplements you use. Also tell them if you smoke, drink alcohol, or use illegal drugs. Some items may interact with your medicine.  What should I watch for while using this medicine?  Visit your doctor or health care professional for regular check ups. Contact your doctor right away if your symptoms worsen. Check your blood pressure and pulse rate regularly. Ask your health care professional what your blood pressure and pulse rate should be, and when you should contact them.  You may get drowsy or dizzy. Do not drive, use machinery, or do anything that needs mental alertness until you know how this medicine affects you. Do not sit or stand up quickly, especially if you are an older patient. This reduces the risk of dizzy or fainting spells. Contact your doctor if these symptoms continue. Alcohol may interfere with the effect of this medicine. Avoid alcoholic drinks.  What side effects may I notice from receiving this medicine?  Side effects that you should report to your doctor or health care professional as soon as possible:    allergic reactions like skin rash, itching or  hives    cold or numb hands or feet    depression    difficulty breathing    faint    fever with sore throat    irregular heartbeat, chest pain    rapid weight gain    swollen legs or ankles  Side effects that usually do not require medical attention (report to your doctor or health care professional if they continue or are bothersome):    anxiety or nervousness    change in sex drive or performance    dry skin    headache    nightmares or trouble sleeping    short term memory loss    stomach upset or diarrhea    unusually tired  This list may not describe all possible side effects. Call your doctor for medical advice about side effects. You may report side effects to FDA at 5-654-IWM-6147.  Where should I keep my medicine?  Keep out of the reach of children.  Store at room temperature between 15 and 30 degrees C (59 and 86 degrees F). Throw away any unused medicine after the expiration date.  NOTE:This sheet is a summary. It may not cover all possible information. If you have questions about this medicine, talk to your doctor, pharmacist, or health care provider. Copyright  2016 Gold Standard                Clopidogrel Bisulfate Oral tablet  What is this medicine?  CLOPIDOGREL (kloh PID oh grel) helps to prevent blood clots. This medicine is used to prevent heart attack, stroke, or other vascular events in people who are at high risk.  This medicine may be used for other purposes; ask your health care provider or pharmacist if you have questions.  What should I tell my health care provider before I take this medicine?  They need to know if you have any of the following conditions:    bleeding disorder    bleeding in the brain    planned surgery    stomach or intestinal ulcers    stroke or transient ischemic attack    an unusual or allergic reaction to clopidogrel, other medicines, foods, dyes, or preservatives    pregnant or trying to get pregnant    breast-feeding  How should I use this medicine?  Take this  medicine by mouth with a drink of water. Follow the directions on the prescription label. You may take this medicine with or without food. Take your medicine at regular intervals. Do not take your medicine more often than directed.  Talk to your pediatrician regarding the use of this medicine in children. Special care may be needed.  Overdosage: If you think you have taken too much of this medicine contact a poison control center or emergency room at once.  NOTE: This medicine is only for you. Do not share this medicine with others.  What if I miss a dose?  If you miss a dose, take it as soon as you can. If it is almost time for your next dose, take only that dose. Do not take double or extra doses.  What may interact with this medicine?    aspirin    blood thinners like cilostazol, enoxaparin, ticlopidine, and warfarin    certain medicines for depression like citalopram, fluoxetine, and fluvoxamine    certain medicines for fungal infections like ketoconazole, fluconazole, and voriconazole    certain medicines for HIV infection like delavirdine, efavirenz, and etravirine    certain medicines for seizures like felbamate, oxcarbazepine, and phenytoin    chloramphenicol    fluvastatin    isoniazid, INH    medicines for inflammation like ibuprofen and naproxen    modafinil    nicardipine    over-the counter supplements like echinacea, feverfew, fish oil, garlic, leti, ginkgo, green tea, horse chestnut    quinine    stomach acid blockers like cimetidine, omeprazole, and esomeprazole    tamoxifen    tolbutamide    topiramate    torsemide  This list may not describe all possible interactions. Give your health care provider a list of all the medicines, herbs, non-prescription drugs, or dietary supplements you use. Also tell them if you smoke, drink alcohol, or use illegal drugs. Some items may interact with your medicine.  What should I watch for while using this medicine?  Visit your doctor or health care professional  for regular check ups. Do not stop taking your medicine unless your doctor tells you to.  Notify your doctor or health care professional and seek emergency treatment if you develop breathing problems; changes in vision; chest pain; severe, sudden headache; pain, swelling, warmth in the leg; trouble speaking; sudden numbness or weakness of the face, arm or leg. These can be signs that your condition has gotten worse.  If you are going to have surgery or dental work, tell your doctor or health care professional that you are taking this medicine.  Certain genetic factors may reduce the effect of this medicine. Your doctor may use genetic tests to determine treatment.  What side effects may I notice from receiving this medicine?  Side effects that you should report to your doctor or health care professional as soon as possible:    allergic reactions like skin rash, itching or hives, swelling of the face, lips, or tongue    breathing problems    changes in vision    fever    signs and symptoms of bleeding such as bloody or black, tarry stools; red or dark-brown urine; spitting up blood or brown material that looks like coffee grounds; red spots on the skin; unusual bruising or bleeding from the eye, gums, or nose    sudden weakness    unusual bleeding or bruising  Side effects that usually do not require medical attention (report to your doctor or health care professional if they continue or are bothersome):    constipation or diarrhea    headache    pain in back or joints    stomach upset  This list may not describe all possible side effects. Call your doctor for medical advice about side effects. You may report side effects to FDA at 8-304-FDA-3123.  Where should I keep my medicine?  Keep out of the reach of children.  Store at room temperature of 59 to 86 degrees F (15 to 30 degrees C). Throw away any unused medicine after the expiration date.  NOTE:This sheet is a summary. It may not cover all possible information.  If you have questions about this medicine, talk to your doctor, pharmacist, or health care provider. Copyright  2016 Gold Standard

## 2018-06-08 NOTE — ED PROVIDER NOTES
History     Chief Complaint:  Chest Pain    HPI   Jeremy Lainez is a 42 year old male who presents with intermittent chest pain, currently resovled. The patient states this has been on and off for the 10 days. It is a central pain which lasts from 5-15 minutes. It usually starts after he wakes up, then returns a few more times in the day. It is not related to exertion. He states it feels good to take Tums and burp. He is sometimes sweaty with this. He went to [facility] for evaluation today because he was tempted to go to the [facility] tomorrow, and they referred him here. The patient denies nausea, shortness of breath, cough, leg swelling, and states no other concerns at this time.      Cardiac/PE/DVT Risk Factors:  The patient has no history of hypertension, hyperlipidemia or diabetes and is a smoker.  He reports a family history of heart enlargement (father).  He denies any personal or familial history of PE, DVT or clotting disorder.  He reports no recent travel, surgery or other immobilizations.  He is not on hormone therapy and has no known malignancy.     Allergies:  No known drug allergies.     Medications:    The patient is currently on no regular medications.     Past Medical History:    History reviewed.  No significant past medical history.      Past Surgical History:    History reviewed. No pertinent past surgical history.     Family History:    History reviewed. No pertinent family history.     Social History:  Marital Status:  Single   Smoking status: Current smoker (less than a pack a day)  Alcohol use: No       Review of Systems   Constitutional: Negative for fatigue and fever.   Respiratory: Negative for cough and shortness of breath.    Cardiovascular: Positive for chest pain (currently resolved). Negative for leg swelling.   Gastrointestinal: Negative for abdominal pain, constipation, diarrhea, nausea and vomiting.   All other systems reviewed and are negative.    Physical Exam     Patient  "Vitals for the past 24 hrs:   BP Temp Temp src Heart Rate Resp SpO2 Height Weight   06/08/18 1300 (!) 139/98 - - 61 14 98 % - -   06/08/18 1245 (!) 148/97 - - 73 16 96 % - -   06/08/18 1214 - - - - - - 1.88 m (6' 2\") -   06/08/18 1036 (!) 157/110 97.3  F (36.3  C) Temporal 74 16 100 % - 83.9 kg (185 lb)      Physical Exam  General: Patient is alert and interactive when I enter the room  Head:  The scalp, face, and head appear normal  Eyes:  The pupils are equal, round, and reactive to light    Conjunctivae and sclerae are normal  ENT:    External acoustic canals are normal    The oropharynx is normal without erythema.     Uvula is in the midline  Neck:  Normal range of motion  CV:  Regular rate. S1/S2. No murmurs.   Resp:  Lungs are clear without wheezes or rales. No distress  GI:  Abdomen is soft, no rigidity, guarding, or rebound    No distension. No tenderness to palpation in any quadrant.     MS:  Normal tone. Joints grossly normal without effusions.     No asymmetric leg swelling, calf or thigh tenderness.      Normal motor assessment of all extremities.  Skin:  No rash or lesions noted. Normal capillary refill noted  Neuro:  Speech is normal and fluent. Face is symmetric.     Moving all extremities well.   Psych:  Awake. Alert.  Normal affect.  Appropriate interactions.  Lymph: No anterior cervical lymphadenopathy noted     Emergency Department Course     ECG:  @ 0956  Indication: Chest pain  Vent. Rate 68 bpm. FL interval 152 ms. QRS duration 90 ms. QT/QTc 420/446 ms. P-R-T axis 6 43 15.   NSR, marked T wave abnormality consider anterior ischemia, Abnormal ECG.   Read by Osmani Kim.     @ 1038  Indication: Chest pain  Vent. Rate 65 bpm. FL interval 158 ms. QRS duration 92 ms. QT/QTc 424/440 ms. P-R-T axis 15 70 34.   NSR, marked T wave abnormality consider anterior ischemia, Abnormal ECG.    Read @ 1039 by Dr. Joshi.     @ 1211  Indication: Repeat  Vent. Rate 55 bpm. FL interval 162 ms. QRS duration 90 " ms. QT/QTc 456/436 ms. P-R-T axis 3 61 14.   Sinus bradycardia, marked T wave abnormality consider anterolateral ischemia, Abnormal ECG.  No significant change when compared to previous ECG    Read @ 1213 by Dr. Joshi.     Imaging:  Radiology findings were communicated with the patient and Admitting PA who voiced understanding of the findings.   XR Chest 2 Views   Final Result   IMPRESSION: No acute cardiopulmonary abnormality.      JACOB KILPATRICK MD        Laboratory:  Laboratory findings were communicated with the patient and Admitting PA who voiced understanding of the findings.  Labs Ordered and Resulted from Time of ED Arrival Up to the Time of Departure from the ED   TROPONIN I - Abnormal; Notable for the following:        Result Value    Troponin I ES 0.071 (*)     All other components within normal limits   CBC WITH PLATELETS DIFFERENTIAL   INR   PARTIAL THROMBOPLASTIN TIME   BASIC METABOLIC PANEL   CBC WITH PLATELETS     Interventions:  Medications   heparin infusion 25,000 units in 0.45% NaCl 250 mL (1,000 Units/hr Intravenous New Bag 6/8/18 1246)   perflutren diluted 1mL to 2mL with saline (OPTISON) diluted injection 3 mL (3 mLs Intravenous Given 6/8/18 1132)   heparin Loading Dose bolus dose from infusion pump 5,000 Units (5,000 Units Intravenous Given 6/8/18 1247)      Emergency Department Course:  Nursing notes and vitals reviewed.  I performed an exam of the patient as documented above.   IV was inserted and blood was drawn for laboratory testing, results above.  The patient was sent for imaging while in the emergency department, results above.    1200: Patient rechecked and updated.   1240: I spoke with Dr. Bravo of the cardiology service regarding patient's presentation, findings, and plan of care.    1308: I spoke with [facility] for Dr. Clifford  of the hospitalist service regarding patient's presentation, findings, and plan of care.   I discussed the treatment plan with the patient. They expressed  understanding of this plan and consented to admission. I discussed the patient with [place] for Dr. Clifford, who will admit the patient to a monitored bed for further evaluation and treatment.   I personally reviewed the laboratory and imaging results with the Patient and answered all related questions prior to admission.     Impression & Plan      Medical Decision Making:   Jeremy Lainez is a 42 year old male who presents to the emergency department for evaluation of chest pain.  He is a very concerning ECG initially, but has no chest pain actively and has not had chest pain since yesterday at 3 PM.  A broad differential was of course considered.  His EKG looks like he could have Wellens syndrome but he does have slight ST elevation in V2 and V3.  I contemplated calling a STEMI when I first saw him but given he was not diaphoretic, had no chest pain, looks very well I thought it best and safest to get more information including a stat echo, troponin and serial EKGs.  After this information was back and called cardiology.  They will take him to the cath lab now as a NSTEMI with concern for a proximal LAD lesion.  Heparin drip and bolus were started.  His second EKG continues to look similar although his ST elevation is slightly better and the second EKG.    Diagnosis:    ICD-10-CM    1. NSTEMI (non-ST elevated myocardial infarction) (H) I21.4       Disposition:  Admitted     Scribe Disclosure:  I, German Laguerre, am serving as a scribe at 10:59 AM on 6/8/2018 to document services personally performed by Izaiah Gimenez MD, based on my observations and the provider's statements to me.    [facility] EMERGENCY DEPARTMENT       Izaiah Gimenez MD  06/08/18 2837

## 2018-06-08 NOTE — PROGRESS NOTES
Physician Attestation   I, Saad Clifford, saw and evaluated Jeremy Lainez as part of a shared visit.  I have reviewed and discussed with the advanced practice provider their history, physical and plan.    I personally reviewed the vital signs, medications, labs and imaging.    My key history or physical exam findings: Patient seen and examined, symptoms concerning for acute coronary syndrome, had a coronary angiogram showed moderate LAD proximal lesion, nonobstructing so no stent was deployed.  Discussed with the cardiologist Dr. DOHERTY, concern for possible unstable plaque some haziness around it, likely resulted in EKG changes and increased troponin with non-STEMI.  Risk factor includes smoking    Key management decisions made by me: Non-ST elevation myocardial infarction and acute coronary syndrome with increased troponin and EKG changes likely related to proximal LAD lesion   continue dual antiplatelet therapy, statin, beta-blocker, lisinopril, smoking cessation, and nicotine replacement on discharge, patient has been on Chantix in the past.  Patient blood pressure is elevated has not been on treatment in the past  Check liver function test with initiation of statin in the morning, lipid profile and troponin in a.m.      Saad Clifford  Date of Service (when I saw the patient): 06/08/18

## 2018-06-08 NOTE — PROGRESS NOTES
"Pt transferred directly from Cath Lab to 343 post coronary angiogram. Alert/oriented, assist 1. Fall risk explained to pt/pt agrees to call for assistance when OOB.  CMS R wrist intact, strong pulse. Plavix started in cath lab. Denies chest pain/discomfort. Current smoker stating \"I need to quit\".     ROOM # 343  Living Situation (if not independent, order SW consult): Home independent w/spouse & 4 children  : Spouse Oriana  Activity level at baseline: indepenent  Activity level on admit: post coronary angiogram - stand by assist, fall risk.     Patient given Patient Bill of Rights; given the opportunity to ask questions about status and their plan of care.  Patient has been oriented to the room, bathroom and call light is in place.  Discussed discharge goals and expectations with patient/family.     1815 Begin withdrawing air from TR Band. Started w/11ml. Pt education provided: written [place] documents Common Medications & side effects, Coronary Angio packet, Dealing with Heart attack at home. Verbal education regarding removal of air from TR band explained. Pt stated understanding.     "

## 2018-06-08 NOTE — ED TRIAGE NOTES
Pt presents from clinic for evaluation of having intermittent chest pain X2 wks. Pt is pain free right now. Pt was given ASA 324mg PTA. Pt is A&O, ABC's intact.

## 2018-06-09 ENCOUNTER — APPOINTMENT (OUTPATIENT)
Dept: OCCUPATIONAL THERAPY | Facility: CLINIC | Age: 43
DRG: 247 | End: 2018-06-09
Attending: INTERNAL MEDICINE
Payer: COMMERCIAL

## 2018-06-09 LAB
ALT SERPL W P-5'-P-CCNC: 16 U/L (ref 0–70)
AST SERPL W P-5'-P-CCNC: 16 U/L (ref 0–45)
CHOLEST SERPL-MCNC: 149 MG/DL
HDLC SERPL-MCNC: 46 MG/DL
LDLC SERPL CALC-MCNC: 71 MG/DL
NONHDLC SERPL-MCNC: 103 MG/DL
TRIGL SERPL-MCNC: 160 MG/DL
TROPONIN I SERPL-MCNC: 0.15 UG/L (ref 0–0.04)
TROPONIN I SERPL-MCNC: 0.23 UG/L (ref 0–0.04)

## 2018-06-09 PROCEDURE — 80061 LIPID PANEL: CPT | Performed by: INTERNAL MEDICINE

## 2018-06-09 PROCEDURE — 36415 COLL VENOUS BLD VENIPUNCTURE: CPT | Performed by: INTERNAL MEDICINE

## 2018-06-09 PROCEDURE — 25000132 ZZH RX MED GY IP 250 OP 250 PS 637: Performed by: INTERNAL MEDICINE

## 2018-06-09 PROCEDURE — 99233 SBSQ HOSP IP/OBS HIGH 50: CPT | Performed by: INTERNAL MEDICINE

## 2018-06-09 PROCEDURE — 93005 ELECTROCARDIOGRAM TRACING: CPT

## 2018-06-09 PROCEDURE — 84460 ALANINE AMINO (ALT) (SGPT): CPT | Performed by: INTERNAL MEDICINE

## 2018-06-09 PROCEDURE — 84484 ASSAY OF TROPONIN QUANT: CPT | Performed by: INTERNAL MEDICINE

## 2018-06-09 PROCEDURE — 12000007 ZZH R&B INTERMEDIATE

## 2018-06-09 PROCEDURE — 84450 TRANSFERASE (AST) (SGOT): CPT | Performed by: INTERNAL MEDICINE

## 2018-06-09 PROCEDURE — 40000133 ZZH STATISTIC OT WARD VISIT: Performed by: OCCUPATIONAL THERAPIST

## 2018-06-09 PROCEDURE — 12000000 ZZH R&B MED SURG/OB

## 2018-06-09 PROCEDURE — 93010 ELECTROCARDIOGRAM REPORT: CPT | Performed by: INTERNAL MEDICINE

## 2018-06-09 PROCEDURE — 99407 BEHAV CHNG SMOKING > 10 MIN: CPT | Performed by: OCCUPATIONAL THERAPIST

## 2018-06-09 PROCEDURE — 40000275 ZZH STATISTIC RCP TIME EA 10 MIN

## 2018-06-09 RX ADMIN — CLOPIDOGREL 75 MG: 75 TABLET, FILM COATED ORAL at 07:43

## 2018-06-09 RX ADMIN — ASPIRIN 81 MG: 81 TABLET, COATED ORAL at 07:42

## 2018-06-09 RX ADMIN — ATORVASTATIN CALCIUM 40 MG: 40 TABLET, FILM COATED ORAL at 07:42

## 2018-06-09 ASSESSMENT — ACTIVITIES OF DAILY LIVING (ADL)
ADLS_ACUITY_SCORE: 9

## 2018-06-09 NOTE — PROGRESS NOTES
"[facility] Cardiology Progress Note      Problem List:     Patient Active Problem List   Diagnosis     AMI (acute myocardial infarction)            History:   43 yo man presenting with about 10d of intermittent midsternal chest discomfort with vague radiation to left arm, lasting 5-10 minutes at a time. Neither pleuritic nor positional. No other associated complaints. No radiation to back. He also noted his BP to be elevated (with spontaneous conjunctival hemorrhage). No prior elevated BP history. +tobacco abuse No family history CAD, DM nor known dyslipidemia.    Abnormal ECG concerning for anterior T inversions c/w Wellen's sign. Trop mildly elevated (incr this AM). Cardiac catheterization yesterday with 60? High OM lesion (ostial) and 40-50% p-mLAD lesion, negative FFR (0.84) across the latter.  No fam h/o SCD. No personal h/o syncope, LH, palpitations. Echo not consistent with HCM.    No further CP since Thursday.    No other cardioresp complaints of sob, lh, palp, orthopnea, pnd, edema             Review of Systems:   The Review of Systems is negative other than noted in the HPI.           Medications:       aspirin  81 mg Oral Daily     atorvastatin  40 mg Oral Daily     clopidogrel  75 mg Oral Daily     lisinopril  2.5 mg Oral Daily     metoprolol tartrate  25 mg Oral BID             Physical Exam:   Most recent vitals:   Blood pressure 114/72, pulse 65, temperature 96.8  F (36  C), temperature source Oral, resp. rate 18, height 1.88 m (6' 2\"), weight 82.7 kg (182 lb 4.8 oz), SpO2 97 %.      Vital Sign Ranges  Temperature Temp  Av.6  F (35.9  C)  Min: 95.4  F (35.2  C)  Max: 97.9  F (36.6  C)   Blood pressure Systolic (24hrs), Av , Min:100 , Max:147        Diastolic (24hrs), Av, Min:66, Max:98      Pulse Pulse  Av.7  Min: 56  Max: 65   Respirations Resp  Av.6  Min: 16  Max: 20   Pulse oximetry SpO2  Av %  Min: 96 %  Max: 98 %     Last 4 weights:  Wt Readings from Last 4 " Encounters:   06/08/18 82.7 kg (182 lb 4.8 oz)         I&O:    Intake/Output Summary (Last 24 hours) at 06/09/18 1548  Last data filed at 06/09/18 0830   Gross per 24 hour   Intake              890 ml   Output              600 ml   Net              290 ml       Telemetry:    nsr    Physical Exam:  Constitutional:   in no apparent distress and eupneic   Skin:   normal, no bruising or bleeding, no lesions and no jaundice   Head:   Normocephalic, atramatic   Eyes:   pupils equal, round and reactive to light, extra ocular muscles intact, sclera clear, conjunctiva normal   Neck:   no JVD and supple, symmetrical, trachea midline   Chest:   Normal Symmetry and no tenderness   Lungs:   normal, scattered wheezes and symmetric, clear to auscultation otherwise   Cardiovascular:   Normal apical impulse, regular rate and rhythm, normal S1 and S2, no S3 or S4, and no murmur noted   Abdomen:   Normal, normal bowel sounds and non-tender   Hematologic/Lymphatic:   not performed    Extremities and Back:   symmetric, no curvature, no joint swelling or tenderness, no cyanosis or clubbing and No edema   Neurological:   Awake, alert, oriented to name, place and time.    No gross or focal neurologic abnormalities  Moving all extremities   Genitourinary:   not performed                Data:     Lab Results   Component Value Date    WBC 7.0 06/08/2018    HGB 16.0 06/08/2018    HCT 46.2 06/08/2018     06/08/2018    ALT 16 06/09/2018    AST 16 06/09/2018     06/08/2018    BUN 10 06/08/2018    CO2 29 06/08/2018    INR 0.92 06/08/2018     No results found for: CKTOTAL, CKMB  Lab Results   Component Value Date    HDL 46 06/09/2018    LDL 71 06/09/2018    CHOL 149 06/09/2018    TRIG 160 06/09/2018        EKG results:      Imaging:      Imaging:         Assessment and Plan:     43 yo man with presentation for typical anginal quality CP (though occurring mostly in AM at rest) and abnormal ECG with persistent anterior T inversions  concerning for LAD lesion. Mildly elevated troponins.   Cath with LAD lesion with negative FFR. Also high OM ostial lesion as noted.     With persistent ECG changes and mildly further elevated trop, will hold patient after review of films with Dr. Ferguson. He recommended consideration of symptom-limited treadmill tomorrow with which I agree. Would do symptom-limited stress echo tomorrow to assess significance of lesions further in setting of persistent ECG changes provided troponins trend down today.    Discussed at length with patient and wife (and Dr. Ferguson) and further recs pending study tomorrow and labs.    Will stop ACEI given normal EF and hypotension and reduce beta-blocker dose. Maintain statin and DAPT      Attestation:    Total time: 45 minutes/ 40 spent in coordination of care and counselling.         Milagros Underwood MD 6/9/2018  3:48 PM

## 2018-06-09 NOTE — PLAN OF CARE
Problem: Patient Care Overview  Goal: Plan of Care/Patient Progress Review  Outcome: Improving  Denies chest pressure/pain/sx at rest and w/ambulation in halls. CMS to R wrist intact. Pt refuses a.m. Metoprolol/lisinopril due to lower BP's and desire to discuss w/MD. Tele noted at noc pauses 1.5-1.8sec. SB 55-60's BBB, inverted T waves. Plan for stress echo tomorrow per cards. Med to be adjusted by Cardiologist.

## 2018-06-09 NOTE — PROVIDER NOTIFICATION
06/09/18 0755   Significant Event   Significant Event Other (see comments)  (troponin 0.228 web msg to Dr. Lanier)

## 2018-06-09 NOTE — PROGRESS NOTES
Patient was seen and examined by me this morning.  Jeremy is doing well without complaint of chest pain or shortness of breath.  He and his wife as well as their son in the room.  Several discussions including diagnosis of coronary artery disease and need to have his blood pressure controlled as well as get a primary care doctor for follow-up stressed.  Patient is worried about multiple pills.  His blood pressure has been running slow less than 60 ranging in 50s.  His LDL cholesterol is 71.  His blood pressure is on the low side as well.  Cardiology team to see patient and await further discharge medication recommendations.  May needs to simplify or decrease dosage of his blood pressure medications.  I will leave that decision to cardiology team to discuss with patient.  If okay with cardiology patient, patient can be discharged today.

## 2018-06-09 NOTE — PROGRESS NOTES
Hospitalist Progress Note  [facility]      Tae Lanier MD 06/09/2018      Reason for hospitalization:chest pain          Assessment and Plan:        Brief patient summary of Stay:    Jeremy Lainez is a 42 year old male without significant PMH, or strong family hx of CAD, who presents with 10 days of intermittent substernal chest pain not always associated with exertion.  However workup in the emergency room reveals slightly elevated troponin as well as a abnormal EKG with dynamic changes with T-wave inversions in lead V1 and 5 concerning for LAD ischemia.  He has been started on heparin drip and cardiology was consulted and patient had coronary angiogram and resulted as follows:Moderate proximal LAD stenosis which is nonflow limiting (FFR 0.84)disease and no obstructive disease elsewhere.  Interventional radiologist recommended that given the patient's clinical presentation and moderate LAD stenosis that was recommended that patient be on dual antiplatelet therapy for the next 12 months and risk factor control to prevent progression of patient's coronary artery disease.  Patient was encouraged to stop smoking.     Hospital Active Problem List,Assessment and Plan:      1. Coronary artery disease: Status post coronary angiogram, currently stable without chest pain or no significant EKG changes.  --Patient is improving, continue statin, low-dose beta-blocker, monitor on telemetry.  --Cardiology is recommended to stay another day for stress testing tomorrow    2. Hypertension: Controlled, continue beta-blocker, was on lisinopril but his blood pressures running low and may not tolerate additional lisinopril    3. Tobacco smoking: Patient with counseled to quit smoking    #Pain management: # Pain Assessment:  Current Pain Score 6/9/2018   Patient currently in pain? denies   Pain score (0-10) -   Jeremy blackwell pain level was assessed and he currently denies pain.      #DVT Prophylaxis: Ambulate every  shift            Code Status and Goal of care: Full Code      Discharge Plan:Patient is stable, asymptomatic.  He may be discharged after stress test tomorrow if okay with cardiologist.  I advised him to get primary care physician for coordination of his care.        Interval History (Subjective):             Patient was seen and examined by me today and medical record reviewed.Overnight events noted and care discussed with nursing staff and treatment team.    doing well; no cp, sob, n/v/d, or abd pain.                   Physical Exam:      Last Vital Signs:  Temp:  [95.4  F (35.2  C)-97.9  F (36.6  C)] 96.8  F (36  C)  Pulse:  [56-65] 65  Heart Rate:  [60-65] 62  Resp:  [16-20] 18  BP: (100-143)/(66-98) 114/72  SpO2:  [96 %-98 %] 97 %  I/O last 3 completed shifts:  In: 890 [P.O.:640; I.V.:250]  Out: 600 [Urine:600]    Wt Readings from Last 5 Encounters:   06/08/18 82.7 kg (182 lb 4.8 oz)       GEN:   Alert, oriented x 3, appears comfortable, NAD.  NECK:Supple ,no mass or thyromegaly   HEENT:   Normocephalic/atraumatic, no scleral icterus, no nasal discharge, mouth moist.  CV:   Regular rate and rhythm, no murmur or JVD.  S1 + S2 noted, no S3 or S4.  LUNGS:  Clear to auscultation bilaterally without rales/rhonchi/wheezing/retractions.  Symmetric chest rise on inhalation noted.  ABD:  Active bowel sounds, soft, non-tender/non-distended.  No rebound/guarding/rigidity.  EXT:  No edema.  No cyanosis.  No joint synovitis noted.  SKIN:  Dry to touch, no exanthems noted in the visualized areas.  Neurologic:Grossly intact,non focal                    Medications:      All current medications were reviewed with changes reflected in problem list.    Medications     sodium chloride 75 mL/hr at 06/08/18 1717       aspirin  81 mg Oral Daily     atorvastatin  40 mg Oral Daily     clopidogrel  75 mg Oral Daily     [START ON 6/10/2018] metoprolol succinate  12.5 mg Oral Daily                Data:          Data reviewed today: I  reviewed all new labs and imaging results over the last 24 hours. I personally reviewed no images or EKG's today        Recent Labs  Lab 06/08/18  1046   WBC 7.0   HGB 16.0   HCT 46.2   MCV 92        No results for input(s): CULT in the last 168 hours.    Recent Labs  Lab 06/09/18  0617 06/08/18  1046   NA  --  141   POTASSIUM  --  3.7   CHLORIDE  --  108   CO2  --  29   ANIONGAP  --  4   GLC  --  90   BUN  --  10   CR  --  0.86   GFRESTIMATED  --  >90   GFRESTBLACK  --  >90   MARIZA  --  9.0   AST 16  --    ALT 16  --          Recent Labs  Lab 06/08/18  1046   GLC 90             Recent Labs  Lab 06/08/18  1046   INR 0.92           Recent Labs  Lab 06/09/18  1402 06/09/18  0617 06/08/18  1046   TROPI 0.155* 0.228* 0.071*       Recent Results (from the past 48 hour(s))   XR Chest 2 Views    Narrative    XR CHEST 2 VW 6/8/2018 11:44 AM    HISTORY: Chest pain.    COMPARISON: None.    FINDINGS: No airspace consolidation, pleural effusion or pneumothorax.  Normal heart size.      Impression    IMPRESSION: No acute cardiopulmonary abnormality.    JACOB KILPATRICK MD               Disclaimer: This note consists of symbols derived from keyboarding, dictation and/or voice recognition software. As a result, there may be errors in the script that have gone undetected. Please consider this when interpreting information found in this chart

## 2018-06-09 NOTE — PLAN OF CARE
Problem: Patient Care Overview  Goal: Plan of Care/Patient Progress Review  Outcome: Improving  VSS  Alert/oriented denies pain ambulates with Ax1 TR band started with 3ml at 1900 TR band off at mid night no bleeding CMS in tact.  On Plavix and ASA. Tele SBPossible D/C today

## 2018-06-10 ENCOUNTER — APPOINTMENT (OUTPATIENT)
Dept: CARDIOLOGY | Facility: CLINIC | Age: 43
DRG: 247 | End: 2018-06-10
Attending: INTERNAL MEDICINE
Payer: COMMERCIAL

## 2018-06-10 LAB
ERYTHROCYTE [DISTWIDTH] IN BLOOD BY AUTOMATED COUNT: 12.1 % (ref 10–15)
HCT VFR BLD AUTO: 43.4 % (ref 40–53)
HGB BLD-MCNC: 15.1 G/DL (ref 13.3–17.7)
LMWH PPP CHRO-ACNC: 0.32 IU/ML
MCH RBC QN AUTO: 31.9 PG (ref 26.5–33)
MCHC RBC AUTO-ENTMCNC: 34.8 G/DL (ref 31.5–36.5)
MCV RBC AUTO: 92 FL (ref 78–100)
PLATELET # BLD AUTO: 211 10E9/L (ref 150–450)
RBC # BLD AUTO: 4.73 10E12/L (ref 4.4–5.9)
TROPONIN I SERPL-MCNC: 0.06 UG/L (ref 0–0.04)
TROPONIN I SERPL-MCNC: 0.07 UG/L (ref 0–0.04)
TROPONIN I SERPL-MCNC: 0.08 UG/L (ref 0–0.04)
TROPONIN I SERPL-MCNC: 0.1 UG/L (ref 0–0.04)
WBC # BLD AUTO: 7.4 10E9/L (ref 4–11)

## 2018-06-10 PROCEDURE — 93325 DOPPLER ECHO COLOR FLOW MAPG: CPT | Mod: 26 | Performed by: INTERNAL MEDICINE

## 2018-06-10 PROCEDURE — 99233 SBSQ HOSP IP/OBS HIGH 50: CPT | Mod: 25 | Performed by: INTERNAL MEDICINE

## 2018-06-10 PROCEDURE — 12000007 ZZH R&B INTERMEDIATE

## 2018-06-10 PROCEDURE — 36415 COLL VENOUS BLD VENIPUNCTURE: CPT | Performed by: INTERNAL MEDICINE

## 2018-06-10 PROCEDURE — 25000132 ZZH RX MED GY IP 250 OP 250 PS 637: Performed by: INTERNAL MEDICINE

## 2018-06-10 PROCEDURE — 40000275 ZZH STATISTIC RCP TIME EA 10 MIN

## 2018-06-10 PROCEDURE — 93321 DOPPLER ECHO F-UP/LMTD STD: CPT | Mod: 26 | Performed by: INTERNAL MEDICINE

## 2018-06-10 PROCEDURE — 84484 ASSAY OF TROPONIN QUANT: CPT | Performed by: INTERNAL MEDICINE

## 2018-06-10 PROCEDURE — 85520 HEPARIN ASSAY: CPT | Performed by: INTERNAL MEDICINE

## 2018-06-10 PROCEDURE — 25500064 ZZH RX 255 OP 636: Performed by: INTERNAL MEDICINE

## 2018-06-10 PROCEDURE — 25000128 H RX IP 250 OP 636: Performed by: INTERNAL MEDICINE

## 2018-06-10 PROCEDURE — 12000000 ZZH R&B MED SURG/OB

## 2018-06-10 PROCEDURE — 93010 ELECTROCARDIOGRAM REPORT: CPT | Mod: 76 | Performed by: INTERNAL MEDICINE

## 2018-06-10 PROCEDURE — 93308 TTE F-UP OR LMTD: CPT

## 2018-06-10 PROCEDURE — 93005 ELECTROCARDIOGRAM TRACING: CPT

## 2018-06-10 PROCEDURE — 85027 COMPLETE CBC AUTOMATED: CPT | Performed by: INTERNAL MEDICINE

## 2018-06-10 PROCEDURE — 93308 TTE F-UP OR LMTD: CPT | Mod: 26 | Performed by: INTERNAL MEDICINE

## 2018-06-10 PROCEDURE — 99233 SBSQ HOSP IP/OBS HIGH 50: CPT | Performed by: INTERNAL MEDICINE

## 2018-06-10 RX ORDER — NITROGLYCERIN 0.4 MG/1
0.4 TABLET SUBLINGUAL EVERY 5 MIN PRN
Status: DISCONTINUED | OUTPATIENT
Start: 2018-06-10 | End: 2018-06-11

## 2018-06-10 RX ORDER — AMLODIPINE BESYLATE 2.5 MG/1
2.5 TABLET ORAL DAILY
Status: DISCONTINUED | OUTPATIENT
Start: 2018-06-10 | End: 2018-06-10

## 2018-06-10 RX ORDER — ASPIRIN 81 MG/1
243 TABLET ORAL ONCE
Status: COMPLETED | OUTPATIENT
Start: 2018-06-10 | End: 2018-06-10

## 2018-06-10 RX ADMIN — CLOPIDOGREL 75 MG: 75 TABLET, FILM COATED ORAL at 08:11

## 2018-06-10 RX ADMIN — Medication 12.5 MG: at 08:33

## 2018-06-10 RX ADMIN — ASPIRIN 243 MG: 81 TABLET, COATED ORAL at 09:08

## 2018-06-10 RX ADMIN — ATORVASTATIN CALCIUM 40 MG: 40 TABLET, FILM COATED ORAL at 08:11

## 2018-06-10 RX ADMIN — HUMAN ALBUMIN MICROSPHERES AND PERFLUTREN 8 ML: 10; .22 INJECTION, SOLUTION INTRAVENOUS at 10:30

## 2018-06-10 RX ADMIN — HEPARIN SODIUM 1000 UNITS/HR: 10000 INJECTION, SOLUTION INTRAVENOUS at 08:30

## 2018-06-10 RX ADMIN — Medication 5000 UNITS: at 08:31

## 2018-06-10 RX ADMIN — ASPIRIN 81 MG: 81 TABLET, COATED ORAL at 08:12

## 2018-06-10 ASSESSMENT — ACTIVITIES OF DAILY LIVING (ADL)
ADLS_ACUITY_SCORE: 9

## 2018-06-10 NOTE — PROGRESS NOTES
"[facility] Progress Note      Problem List:     Patient Active Problem List   Diagnosis     AMI (acute myocardial infarction)            History:       43 yo man presenting with about 10d of intermittent midsternal chest discomfort with vague radiation to left arm, lasting 5-10 minutes at a time. Neither pleuritic nor positional. No other associated complaints. No radiation to back. He also noted his BP to be elevated (with spontaneous conjunctival hemorrhage). No prior elevated BP history. +tobacco abuse No family history CAD, DM nor known dyslipidemia.     Abnormal ECG concerning for anterior T inversions c/w Wellen's sign. Trop mildly elevated (incr  post-cath). Cardiac catheterization  with 60% oOM1 lesion and 40-50% p-mLAD lesion, negative FFR (0.84) across the latter.  No fam h/o SCD. No personal h/o syncope, LH, palpitations. Echo not consistent with HCM.     No further CP since Thursday until this morning - described to nursing as \"palpitations\" however with benign telemetry. Stress test was aborted with ECG demonstrating antlat SINDI with the \"palpitations\" which he now describes as a milder form of his prior chest discomfort.   Returned to his room and heparin started, given metoprolol. On ASA, plavix, statin.     Now with no cardioresp complaints of chest discomfort, sob, lh, palp, orthopnea, pnd, edema       Review of Systems:   The Review of Systems is negative other than noted in the HPI.           Medications:       aspirin  81 mg Oral Daily     atorvastatin  40 mg Oral Daily     clopidogrel  75 mg Oral Daily     metoprolol succinate  12.5 mg Oral Daily             Physical Exam:   Most recent vitals:   Blood pressure 120/78, pulse 62, temperature 95.7  F (35.4  C), temperature source Oral, resp. rate 20, height 1.88 m (6' 2\"), weight 82.7 kg (182 lb 4.8 oz), SpO2 98 %.      Vital Sign Ranges  Temperature Temp  Av.4  F (35.8  C)  Min: 95.7  F (35.4  C)  Max: 96.8  F (36  C) "   Blood pressure Systolic (24hrs), Av , Min:114 , Max:138        Diastolic (24hrs), Av, Min:72, Max:84      Pulse Pulse  Av  Min: 56  Max: 65   Respirations Resp  Av.8  Min: 18  Max: 20   Pulse oximetry SpO2  Av.5 %  Min: 97 %  Max: 98 %     Last 4 weights:  Wt Readings from Last 4 Encounters:   18 82.7 kg (182 lb 4.8 oz)         I&O:    Intake/Output Summary (Last 24 hours) at 06/10/18 0948  Last data filed at 18 1800   Gross per 24 hour   Intake              800 ml   Output                0 ml   Net              800 ml       Telemetry:    nsr    Physical Exam:  Constitutional:    in no apparent distress and eupneic   Skin:    normal, no bruising or bleeding, no lesions and no jaundice   Head:    Normocephalic, atramatic   Eyes:    pupils equal, round and reactive to light, extra ocular muscles intact, sclera clear, conjunctiva normal   Neck:    no JVD and supple, symmetrical, trachea midline   Chest:    Normal Symmetry and no tenderness   Lungs:    normal, scattered wheezes and symmetric, clear to auscultation otherwise   Cardiovascular:    Normal apical impulse, regular rate and rhythm, normal S1 and S2, no S3 or S4, and no murmur noted   Abdomen:    Normal, normal bowel sounds and non-tender   Hematologic/Lymphatic:    not performed    Extremities and Back:    symmetric, no curvature, no joint swelling or tenderness, no cyanosis or clubbing and No edema   Neurological:    Awake, alert, oriented to name, place and time.    No gross or focal neurologic abnormalities  Moving all extremities   Genitourinary:    not performed            Data:     Lab Results   Component Value Date    WBC 7.4 06/10/2018    HGB 15.1 06/10/2018    HCT 43.4 06/10/2018     06/10/2018    ALT 16 2018    AST 16 2018     2018    BUN 10 2018    CO2 29 2018    INR 0.92 2018     No results found for: CKTOTAL, CKMB  Lab Results   Component Value Date    HDL 46  06/09/2018    LDL 71 06/09/2018    CHOL 149 06/09/2018    TRIG 160 06/09/2018               Assessment and Plan:     43 yo man with presentation for typical anginal quality CP and abnormal ECG with persistent anterior T inversions concerning for LAD lesion. Mildly elevated troponins.   Cath with LAD lesion with negative FFR. Also high OM ostial lesion as noted.      Held an additional day with the ECG abnormalities - submaximal stress test aborted with new symptoms and ECG changes this morning. Mild persistent ant SINDI on ECG, patient entirely asymptomatic at this time.  Discussed with Dr. Ferguson on-call interventionalist at [facility] and would transfer if echo demonstrates any RWMA. Or with recurrent symptoms on medical therapy. Discussed with patient and wife who verbalize understanding and agree.       Attestation:    Total time: 45 minutes/ 35 spent in coordination of care and counselling.         Milagros Underwood MD 6/10/2018  9:48 AM

## 2018-06-10 NOTE — PROGRESS NOTES
Reviewed echo with Dr. Rojas and Dr. Ferguson. Potentially a subtle anterior/high-lat decrease in thickening in contrast images. Patient entirely pain-free and trop continue to downtrend. ECG still concerning for anterior SINDI/biphasic ST.   Discussed case at length with [facility] team- from cath lab standpoint they would defer until tomorrow while patient remains pain-free and provided trops do not rise.   Will follow troponins closely- further recs pending course.

## 2018-06-10 NOTE — PROGRESS NOTES
[facility]    Hospitalist Progress Note  Provider : Rossana Aguayo MD  Date of Service (when I saw the patient): 06/10/2018    Assessment & Plan      Jeremy Lainez is a 42 year old male without significant PMH, or strong family hx of CAD, who presents with 10 days of intermittent substernal chest pain not always associated with exertion.  However workup in the emergency room reveals slightly elevated troponin as well as a abnormal EKG with dynamic changes with T-wave inversions in lead V1 and 5 concerning for LAD ischemia. He has been started on heparin drip and cardiology was consulted and patient had coronary angiogram and resulted as follows: Moderate proximal LAD stenosis which is nonflow limiting (FFR 0.84)disease and no obstructive disease elsewhere. Interventional radiologist recommended that given the patient's clinical presentation and moderate LAD stenosis that was recommended that patient be on dual antiplatelet therapy for the next 12 months and risk factor control to prevent progression of patient's coronary artery disease. Patient was encouraged to stop smoking. Patient had evidence of ST elevation with palpitations during stress test today. Restarted on heparin drip today per cardiology recommendations.     Hospital Active Problem List, assessment and Plan:    1. Coronary artery disease: Status post coronary angiogram  --Patient was noted to have EKG changes concerning for ST elevation.  Echo was done and showed septal anterior/I lateral decrease in thickening and contrast images per cardiology.  Troponins trending down.  Cardiology recommended to start on heparin drip.  Will follow troponin.Dr. Benson discussed with [facility] team and plan is to defer until tomorrow while patient is pain-free.  --Continue aspirin, Lipitor, Plavix, metoprolol, heparin drip  --Case was discussed with cardiology. Appreciate input    2. Hypertension: Controlled, continue beta-blocker    3.  Tobacco smoking: Patient with counseled to quit smoking    Code status: Full Code    Pain Assessment:  Current Pain Score 6/10/2018   Patient currently in pain? denies   Pain score (0-10) -   Pain location -   Pain descriptors -   Jeremy blackwell pain level was assessed and he currently denies pain.    Incidental Findings: None  DVT Prophylaxis: Pneumatic Compression Devices.  Also started on heparin drip  Code Status: Full code  Disposition: Expected discharge: TBD.  Started on heparin drip today    Rossana Aguayo MD    Interval History   Patient seen and examined. He stated that he is feeling better.  He had stress test this morning had not had palpitations during stress test.  Now chest pain improving.  He denies vomiting.  No shortness of breath.  No fever.  Case discussed with cardiology and was started on heparin drip.    -Data reviewed today: I reviewed all new labs and imaging results over the last 24 hours. I personally reviewed    Physical Exam   Temp: 95.7  F (35.4  C) Temp src: Oral BP: 123/81 Pulse: 62 Heart Rate: 65 Resp: 20 SpO2: 98 % O2 Device: None (Room air)    Vitals:    06/08/18 1036 06/08/18 1600   Weight: 83.9 kg (185 lb) 82.7 kg (182 lb 4.8 oz)     Vital Signs with Ranges  Temp:  [95.7  F (35.4  C)-96.8  F (36  C)] 95.7  F (35.4  C)  Pulse:  [62-65] 62  Heart Rate:  [53-65] 65  Resp:  [18-20] 20  BP: (112-138)/(72-88) 123/81  SpO2:  [97 %-98 %] 98 %  I/O last 3 completed shifts:  In: 1120 [P.O.:1120]  Out: -     GEN:  Alert, oriented x 3, appears comfortable, NAD.  HEENT:  Normocephalic/atraumatic, no scleral icterus, no nasal discharge, mouth moist.  CV:  Regular rate and rhythm, no murmur or JVD.  S1 + S2 noted, no S3 or S4.  LUNGS:  Clear to auscultation bilaterally without rales/rhonchi/wheezing/retractions.  Symmetric chest rise on inhalation noted.  ABD:  Active bowel sounds, soft, non-tender/non-distended.  No rebound/guarding/rigidity.  EXT:  No edema or cyanosis.  Hands/feet warm to touch  with good signs of peripheral perfusion.  No joint synovitis noted.  SKIN:  Dry to touch, no exanthems noted in the visualized areas.  NEURO:  Symmetric muscle strength, sensation to touch grossly intact.  No new focal deficits appreciated.    Medications     HEParin 1,000 Units/hr (06/10/18 0830)       aspirin  81 mg Oral Daily     atorvastatin  40 mg Oral Daily     clopidogrel  75 mg Oral Daily     metoprolol succinate  12.5 mg Oral Daily       Data     Recent Labs  Lab 06/10/18  1006 06/10/18  0620 06/09/18  1402 06/09/18  0617 06/08/18  1046   WBC  --  7.4  --   --  7.0   HGB  --  15.1  --   --  16.0   MCV  --  92  --   --  92   PLT  --  211  --   --  215   INR  --   --   --   --  0.92   NA  --   --   --   --  141   POTASSIUM  --   --   --   --  3.7   CHLORIDE  --   --   --   --  108   CO2  --   --   --   --  29   BUN  --   --   --   --  10   CR  --   --   --   --  0.86   ANIONGAP  --   --   --   --  4   MARIZA  --   --   --   --  9.0   GLC  --   --   --   --  90   ALT  --   --   --  16  --    AST  --   --   --  16  --    TROPI 0.079* 0.097* 0.155* 0.228* 0.071*       No results found for this or any previous visit (from the past 24 hour(s)).

## 2018-06-10 NOTE — PLAN OF CARE
Problem: Patient Care Overview  Goal: Plan of Care/Patient Progress Review  Outcome: Improving  VSS  Alert/oriented denies pain ambulates independently  On Plavix and ASA. Tele SB/ SR with inverted T waves. Stress echo scheduled for today .

## 2018-06-10 NOTE — PROVIDER NOTIFICATION
"RN writer notified Dr. Aguayo of pt status in cardio lab. Pt complained of \"palpitations\" just prior to going to Cardio lab for stress echo. Neelam in Cardio had informed the Cardiologist of the palpitations when she clarified order for stress echo. The patient did not start the stress echo due to ST elevation on EKG. EKG normalized after event but re-appeared (see EKG rhythm strips from Cardiopulmonary).  Pt returned to room 343 pain free. /79, HR 55. Repeat HR prior to meds 62.  Heparin bolus/infusion started and EKG ordered. RN requested pt call if any sx reappear.  Nitro available.   Addendum 1400: pain free. IV heparin. Troponin trending down. Diet restarted. Minimal activity. Followed closely by cardiology. Probable repeat coronary angiogram tomorrow.   "

## 2018-06-11 ENCOUNTER — APPOINTMENT (OUTPATIENT)
Dept: CARDIOLOGY | Facility: CLINIC | Age: 43
DRG: 247 | End: 2018-06-11
Attending: INTERNAL MEDICINE
Payer: COMMERCIAL

## 2018-06-11 LAB
APTT PPP: 122 SEC (ref 22–37)
APTT PPP: 32 SEC (ref 22–37)
CHOLEST SERPL-MCNC: 140 MG/DL
GLUCOSE BLDC GLUCOMTR-MCNC: 78 MG/DL (ref 70–99)
GLUCOSE BLDC GLUCOMTR-MCNC: 90 MG/DL (ref 70–99)
HDLC SERPL-MCNC: 51 MG/DL
KCT BLD-ACNC: 412 SEC (ref 75–150)
LDLC SERPL CALC-MCNC: 79 MG/DL
LMWH PPP CHRO-ACNC: 0.28 IU/ML
MRSA DNA SPEC QL NAA+PROBE: NEGATIVE
NONHDLC SERPL-MCNC: 89 MG/DL
SPECIMEN SOURCE: NORMAL
TRIGL SERPL-MCNC: 52 MG/DL

## 2018-06-11 PROCEDURE — 25000128 H RX IP 250 OP 636: Performed by: INTERNAL MEDICINE

## 2018-06-11 PROCEDURE — 85520 HEPARIN ASSAY: CPT | Performed by: INTERNAL MEDICINE

## 2018-06-11 PROCEDURE — 25000132 ZZH RX MED GY IP 250 OP 250 PS 637: Performed by: INTERNAL MEDICINE

## 2018-06-11 PROCEDURE — C1769 GUIDE WIRE: HCPCS

## 2018-06-11 PROCEDURE — 12000010 ZZH R&B MS INTERMEDIATE OVERFLOW

## 2018-06-11 PROCEDURE — 36415 COLL VENOUS BLD VENIPUNCTURE: CPT | Performed by: INTERNAL MEDICINE

## 2018-06-11 PROCEDURE — 99152 MOD SED SAME PHYS/QHP 5/>YRS: CPT

## 2018-06-11 PROCEDURE — C1887 CATHETER, GUIDING: HCPCS

## 2018-06-11 PROCEDURE — 99152 MOD SED SAME PHYS/QHP 5/>YRS: CPT | Performed by: INTERNAL MEDICINE

## 2018-06-11 PROCEDURE — 27210762 ZZH DEVICE SUTURELESS SECUREMENT EA CR2

## 2018-06-11 PROCEDURE — 4A033BC MEASUREMENT OF ARTERIAL PRESSURE, CORONARY, PERCUTANEOUS APPROACH: ICD-10-PCS | Performed by: INTERNAL MEDICINE

## 2018-06-11 PROCEDURE — 27210757 ZZH CATH TEMP PACING HEART CR8

## 2018-06-11 PROCEDURE — 27210946 ZZH KIT HC TOTES DISP CR8

## 2018-06-11 PROCEDURE — 27210802 ZZH SHEATH CR1

## 2018-06-11 PROCEDURE — 25000125 ZZHC RX 250: Performed by: INTERNAL MEDICINE

## 2018-06-11 PROCEDURE — C9600 PERC DRUG-EL COR STENT SING: HCPCS | Mod: LD

## 2018-06-11 PROCEDURE — 27210827 ZZH KIT ACIST INJECTOR CR6

## 2018-06-11 PROCEDURE — 93454 CORONARY ARTERY ANGIO S&I: CPT | Mod: 26 | Performed by: INTERNAL MEDICINE

## 2018-06-11 PROCEDURE — 85347 COAGULATION TIME ACTIVATED: CPT

## 2018-06-11 PROCEDURE — 93005 ELECTROCARDIOGRAM TRACING: CPT

## 2018-06-11 PROCEDURE — 27210742 ZZH CATH CR1

## 2018-06-11 PROCEDURE — 85730 THROMBOPLASTIN TIME PARTIAL: CPT | Performed by: INTERNAL MEDICINE

## 2018-06-11 PROCEDURE — C1725 CATH, TRANSLUMIN NON-LASER: HCPCS

## 2018-06-11 PROCEDURE — 99233 SBSQ HOSP IP/OBS HIGH 50: CPT | Performed by: INTERNAL MEDICINE

## 2018-06-11 PROCEDURE — 87640 STAPH A DNA AMP PROBE: CPT | Performed by: INTERNAL MEDICINE

## 2018-06-11 PROCEDURE — 12000000 ZZH R&B MED SURG/OB

## 2018-06-11 PROCEDURE — 27210759 ZZH DEVICE INFLATION CR6

## 2018-06-11 PROCEDURE — 93454 CORONARY ARTERY ANGIO S&I: CPT

## 2018-06-11 PROCEDURE — 92928 PRQ TCAT PLMT NTRAC ST 1 LES: CPT | Mod: LD | Performed by: INTERNAL MEDICINE

## 2018-06-11 PROCEDURE — B2111ZZ FLUOROSCOPY OF MULTIPLE CORONARY ARTERIES USING LOW OSMOLAR CONTRAST: ICD-10-PCS | Performed by: INTERNAL MEDICINE

## 2018-06-11 PROCEDURE — 027034Z DILATION OF CORONARY ARTERY, ONE ARTERY WITH DRUG-ELUTING INTRALUMINAL DEVICE, PERCUTANEOUS APPROACH: ICD-10-PCS | Performed by: INTERNAL MEDICINE

## 2018-06-11 PROCEDURE — 40000275 ZZH STATISTIC RCP TIME EA 10 MIN

## 2018-06-11 PROCEDURE — 00000146 ZZHCL STATISTIC GLUCOSE BY METER IP

## 2018-06-11 PROCEDURE — C1874 STENT, COATED/COV W/DEL SYS: HCPCS

## 2018-06-11 PROCEDURE — 99233 SBSQ HOSP IP/OBS HIGH 50: CPT | Mod: 25 | Performed by: INTERNAL MEDICINE

## 2018-06-11 PROCEDURE — 99153 MOD SED SAME PHYS/QHP EA: CPT

## 2018-06-11 PROCEDURE — 27210856 ZZH ACCESS HEART CATH CR2

## 2018-06-11 PROCEDURE — C1894 INTRO/SHEATH, NON-LASER: HCPCS

## 2018-06-11 PROCEDURE — 93010 ELECTROCARDIOGRAM REPORT: CPT | Mod: 76 | Performed by: INTERNAL MEDICINE

## 2018-06-11 PROCEDURE — 93463 DRUG ADMIN & HEMODYNMIC MEAS: CPT

## 2018-06-11 PROCEDURE — 33210 INSERT ELECTRD/PM CATH SNGL: CPT

## 2018-06-11 PROCEDURE — 87641 MR-STAPH DNA AMP PROBE: CPT | Performed by: INTERNAL MEDICINE

## 2018-06-11 PROCEDURE — 80061 LIPID PANEL: CPT | Performed by: INTERNAL MEDICINE

## 2018-06-11 RX ORDER — ATROPINE SULFATE 0.1 MG/ML
.5-1 INJECTION INTRAVENOUS
Status: DISCONTINUED | OUTPATIENT
Start: 2018-06-11 | End: 2018-06-11 | Stop reason: HOSPADM

## 2018-06-11 RX ORDER — LIDOCAINE 40 MG/G
CREAM TOPICAL
Status: DISCONTINUED | OUTPATIENT
Start: 2018-06-11 | End: 2018-06-11

## 2018-06-11 RX ORDER — HYDRALAZINE HYDROCHLORIDE 20 MG/ML
10-20 INJECTION INTRAMUSCULAR; INTRAVENOUS
Status: DISCONTINUED | OUTPATIENT
Start: 2018-06-11 | End: 2018-06-11 | Stop reason: HOSPADM

## 2018-06-11 RX ORDER — LIDOCAINE HYDROCHLORIDE 10 MG/ML
1-10 INJECTION, SOLUTION EPIDURAL; INFILTRATION; INTRACAUDAL; PERINEURAL
Status: COMPLETED | OUTPATIENT
Start: 2018-06-11 | End: 2018-06-11

## 2018-06-11 RX ORDER — ENALAPRILAT 1.25 MG/ML
1.25-2.5 INJECTION INTRAVENOUS
Status: DISCONTINUED | OUTPATIENT
Start: 2018-06-11 | End: 2018-06-11 | Stop reason: HOSPADM

## 2018-06-11 RX ORDER — LORAZEPAM 2 MG/ML
.5-2 INJECTION INTRAMUSCULAR EVERY 4 HOURS PRN
Status: DISCONTINUED | OUTPATIENT
Start: 2018-06-11 | End: 2018-06-11 | Stop reason: HOSPADM

## 2018-06-11 RX ORDER — LORAZEPAM 0.5 MG/1
0.5 TABLET ORAL
Status: DISCONTINUED | OUTPATIENT
Start: 2018-06-11 | End: 2018-06-11

## 2018-06-11 RX ORDER — ATROPINE SULFATE 0.1 MG/ML
0.5 INJECTION INTRAVENOUS EVERY 5 MIN PRN
Status: ACTIVE | OUTPATIENT
Start: 2018-06-11 | End: 2018-06-12

## 2018-06-11 RX ORDER — HYDROCODONE BITARTRATE AND ACETAMINOPHEN 5; 325 MG/1; MG/1
1-2 TABLET ORAL EVERY 4 HOURS PRN
Status: DISCONTINUED | OUTPATIENT
Start: 2018-06-11 | End: 2018-06-12 | Stop reason: HOSPADM

## 2018-06-11 RX ORDER — NITROGLYCERIN 20 MG/100ML
0.07-2 INJECTION INTRAVENOUS CONTINUOUS
Status: DISCONTINUED | OUTPATIENT
Start: 2018-06-11 | End: 2018-06-11

## 2018-06-11 RX ORDER — CLOPIDOGREL BISULFATE 75 MG/1
75 TABLET ORAL
Status: DISCONTINUED | OUTPATIENT
Start: 2018-06-11 | End: 2018-06-11 | Stop reason: HOSPADM

## 2018-06-11 RX ORDER — HEPARIN SODIUM 1000 [USP'U]/ML
1000-10000 INJECTION, SOLUTION INTRAVENOUS; SUBCUTANEOUS EVERY 5 MIN PRN
Status: DISCONTINUED | OUTPATIENT
Start: 2018-06-11 | End: 2018-06-11 | Stop reason: HOSPADM

## 2018-06-11 RX ORDER — NITROGLYCERIN 5 MG/ML
100-500 VIAL (ML) INTRAVENOUS
Status: COMPLETED | OUTPATIENT
Start: 2018-06-11 | End: 2018-06-11

## 2018-06-11 RX ORDER — NITROGLYCERIN 20 MG/100ML
.07-2 INJECTION INTRAVENOUS CONTINUOUS PRN
Status: DISCONTINUED | OUTPATIENT
Start: 2018-06-11 | End: 2018-06-11 | Stop reason: HOSPADM

## 2018-06-11 RX ORDER — ACETAMINOPHEN 325 MG/1
325-650 TABLET ORAL EVERY 4 HOURS PRN
Status: DISCONTINUED | OUTPATIENT
Start: 2018-06-11 | End: 2018-06-12 | Stop reason: HOSPADM

## 2018-06-11 RX ORDER — NALOXONE HYDROCHLORIDE 0.4 MG/ML
.2-.4 INJECTION, SOLUTION INTRAMUSCULAR; INTRAVENOUS; SUBCUTANEOUS
Status: DISCONTINUED | OUTPATIENT
Start: 2018-06-11 | End: 2018-06-11

## 2018-06-11 RX ORDER — HEPARIN SODIUM 1000 [USP'U]/ML
INJECTION, SOLUTION INTRAVENOUS; SUBCUTANEOUS
Status: DISCONTINUED
Start: 2018-06-11 | End: 2018-06-11 | Stop reason: HOSPADM

## 2018-06-11 RX ORDER — PHENYLEPHRINE HCL IN 0.9% NACL 1 MG/10 ML
20-100 SYRINGE (ML) INTRAVENOUS
Status: DISCONTINUED | OUTPATIENT
Start: 2018-06-11 | End: 2018-06-11 | Stop reason: HOSPADM

## 2018-06-11 RX ORDER — POTASSIUM CHLORIDE 1500 MG/1
20 TABLET, EXTENDED RELEASE ORAL
Status: DISCONTINUED | OUTPATIENT
Start: 2018-06-11 | End: 2018-06-11

## 2018-06-11 RX ORDER — NITROGLYCERIN 0.4 MG/1
0.4 TABLET SUBLINGUAL EVERY 5 MIN PRN
Status: DISCONTINUED | OUTPATIENT
Start: 2018-06-11 | End: 2018-06-12 | Stop reason: HOSPADM

## 2018-06-11 RX ORDER — NALOXONE HYDROCHLORIDE 0.4 MG/ML
.1-.4 INJECTION, SOLUTION INTRAMUSCULAR; INTRAVENOUS; SUBCUTANEOUS
Status: DISCONTINUED | OUTPATIENT
Start: 2018-06-11 | End: 2018-06-12 | Stop reason: HOSPADM

## 2018-06-11 RX ORDER — ONDANSETRON 2 MG/ML
4 INJECTION INTRAMUSCULAR; INTRAVENOUS EVERY 4 HOURS PRN
Status: DISCONTINUED | OUTPATIENT
Start: 2018-06-11 | End: 2018-06-11 | Stop reason: HOSPADM

## 2018-06-11 RX ORDER — EPINEPHRINE 1 MG/ML
0.3 INJECTION, SOLUTION, CONCENTRATE INTRAVENOUS
Status: DISCONTINUED | OUTPATIENT
Start: 2018-06-11 | End: 2018-06-11 | Stop reason: HOSPADM

## 2018-06-11 RX ORDER — MORPHINE SULFATE 4 MG/ML
1-2 INJECTION, SOLUTION INTRAMUSCULAR; INTRAVENOUS EVERY 5 MIN PRN
Status: DISCONTINUED | OUTPATIENT
Start: 2018-06-11 | End: 2018-06-11 | Stop reason: HOSPADM

## 2018-06-11 RX ORDER — DOPAMINE HYDROCHLORIDE 160 MG/100ML
2-20 INJECTION, SOLUTION INTRAVENOUS CONTINUOUS PRN
Status: DISCONTINUED | OUTPATIENT
Start: 2018-06-11 | End: 2018-06-11 | Stop reason: HOSPADM

## 2018-06-11 RX ORDER — FLUMAZENIL 0.1 MG/ML
0.2 INJECTION, SOLUTION INTRAVENOUS
Status: ACTIVE | OUTPATIENT
Start: 2018-06-11 | End: 2018-06-12

## 2018-06-11 RX ORDER — LORAZEPAM 2 MG/ML
0.5 INJECTION INTRAMUSCULAR
Status: DISCONTINUED | OUTPATIENT
Start: 2018-06-11 | End: 2018-06-11

## 2018-06-11 RX ORDER — ASPIRIN 81 MG/1
81 TABLET ORAL DAILY
Status: DISCONTINUED | OUTPATIENT
Start: 2018-06-12 | End: 2018-06-12 | Stop reason: HOSPADM

## 2018-06-11 RX ORDER — LIDOCAINE 40 MG/G
CREAM TOPICAL
Status: DISCONTINUED | OUTPATIENT
Start: 2018-06-11 | End: 2018-06-11 | Stop reason: HOSPADM

## 2018-06-11 RX ORDER — DIPHENHYDRAMINE HYDROCHLORIDE 50 MG/ML
25-50 INJECTION INTRAMUSCULAR; INTRAVENOUS
Status: DISCONTINUED | OUTPATIENT
Start: 2018-06-11 | End: 2018-06-11 | Stop reason: HOSPADM

## 2018-06-11 RX ORDER — NITROGLYCERIN 5 MG/ML
100-200 VIAL (ML) INTRAVENOUS
Status: DISCONTINUED | OUTPATIENT
Start: 2018-06-11 | End: 2018-06-11 | Stop reason: HOSPADM

## 2018-06-11 RX ORDER — VERAPAMIL HYDROCHLORIDE 2.5 MG/ML
INJECTION, SOLUTION INTRAVENOUS
Status: DISCONTINUED
Start: 2018-06-11 | End: 2018-06-11 | Stop reason: HOSPADM

## 2018-06-11 RX ORDER — FLUMAZENIL 0.1 MG/ML
0.2 INJECTION, SOLUTION INTRAVENOUS
Status: DISCONTINUED | OUTPATIENT
Start: 2018-06-11 | End: 2018-06-11 | Stop reason: HOSPADM

## 2018-06-11 RX ORDER — POTASSIUM CHLORIDE 7.45 MG/ML
10 INJECTION INTRAVENOUS
Status: DISCONTINUED | OUTPATIENT
Start: 2018-06-11 | End: 2018-06-11 | Stop reason: HOSPADM

## 2018-06-11 RX ORDER — LIDOCAINE HYDROCHLORIDE 10 MG/ML
INJECTION, SOLUTION INFILTRATION; PERINEURAL
Status: DISCONTINUED
Start: 2018-06-11 | End: 2018-06-11 | Stop reason: HOSPADM

## 2018-06-11 RX ORDER — LORAZEPAM 0.5 MG/1
0.5 TABLET ORAL
Status: DISCONTINUED | OUTPATIENT
Start: 2018-06-11 | End: 2018-06-11 | Stop reason: HOSPADM

## 2018-06-11 RX ORDER — NIFEDIPINE 10 MG/1
10 CAPSULE ORAL
Status: DISCONTINUED | OUTPATIENT
Start: 2018-06-11 | End: 2018-06-11 | Stop reason: HOSPADM

## 2018-06-11 RX ORDER — DOBUTAMINE HYDROCHLORIDE 200 MG/100ML
2-20 INJECTION INTRAVENOUS CONTINUOUS PRN
Status: DISCONTINUED | OUTPATIENT
Start: 2018-06-11 | End: 2018-06-11 | Stop reason: HOSPADM

## 2018-06-11 RX ORDER — POTASSIUM CHLORIDE 1500 MG/1
20 TABLET, EXTENDED RELEASE ORAL
Status: DISCONTINUED | OUTPATIENT
Start: 2018-06-11 | End: 2018-06-11 | Stop reason: HOSPADM

## 2018-06-11 RX ORDER — ASPIRIN 81 MG/1
81-324 TABLET, CHEWABLE ORAL
Status: DISCONTINUED | OUTPATIENT
Start: 2018-06-11 | End: 2018-06-11 | Stop reason: HOSPADM

## 2018-06-11 RX ORDER — LORAZEPAM 2 MG/ML
0.5 INJECTION INTRAMUSCULAR
Status: DISCONTINUED | OUTPATIENT
Start: 2018-06-11 | End: 2018-06-11 | Stop reason: HOSPADM

## 2018-06-11 RX ORDER — DEXTROSE MONOHYDRATE 25 G/50ML
12.5-5 INJECTION, SOLUTION INTRAVENOUS EVERY 30 MIN PRN
Status: DISCONTINUED | OUTPATIENT
Start: 2018-06-11 | End: 2018-06-11 | Stop reason: HOSPADM

## 2018-06-11 RX ORDER — NICARDIPINE HYDROCHLORIDE 2.5 MG/ML
100 INJECTION INTRAVENOUS
Status: DISCONTINUED | OUTPATIENT
Start: 2018-06-11 | End: 2018-06-11 | Stop reason: HOSPADM

## 2018-06-11 RX ORDER — SODIUM CHLORIDE 9 MG/ML
INJECTION, SOLUTION INTRAVENOUS CONTINUOUS
Status: ACTIVE | OUTPATIENT
Start: 2018-06-11 | End: 2018-06-11

## 2018-06-11 RX ORDER — SODIUM CHLORIDE 9 MG/ML
INJECTION, SOLUTION INTRAVENOUS CONTINUOUS
Status: DISCONTINUED | OUTPATIENT
Start: 2018-06-11 | End: 2018-06-11

## 2018-06-11 RX ORDER — SODIUM NITROPRUSSIDE 25 MG/ML
100-200 INJECTION INTRAVENOUS
Status: DISCONTINUED | OUTPATIENT
Start: 2018-06-11 | End: 2018-06-11 | Stop reason: HOSPADM

## 2018-06-11 RX ORDER — METHYLPREDNISOLONE SODIUM SUCCINATE 125 MG/2ML
125 INJECTION, POWDER, LYOPHILIZED, FOR SOLUTION INTRAMUSCULAR; INTRAVENOUS
Status: DISCONTINUED | OUTPATIENT
Start: 2018-06-11 | End: 2018-06-11 | Stop reason: HOSPADM

## 2018-06-11 RX ORDER — NITROGLYCERIN 0.4 MG/1
0.4 TABLET SUBLINGUAL EVERY 5 MIN PRN
Status: DISCONTINUED | OUTPATIENT
Start: 2018-06-11 | End: 2018-06-11 | Stop reason: HOSPADM

## 2018-06-11 RX ORDER — BUPIVACAINE HYDROCHLORIDE 2.5 MG/ML
1-10 INJECTION, SOLUTION EPIDURAL; INFILTRATION; INTRACAUDAL
Status: DISCONTINUED | OUTPATIENT
Start: 2018-06-11 | End: 2018-06-11 | Stop reason: HOSPADM

## 2018-06-11 RX ORDER — METOPROLOL TARTRATE 1 MG/ML
5 INJECTION, SOLUTION INTRAVENOUS EVERY 5 MIN PRN
Status: DISCONTINUED | OUTPATIENT
Start: 2018-06-11 | End: 2018-06-11 | Stop reason: HOSPADM

## 2018-06-11 RX ORDER — POTASSIUM CHLORIDE 29.8 MG/ML
20 INJECTION INTRAVENOUS
Status: DISCONTINUED | OUTPATIENT
Start: 2018-06-11 | End: 2018-06-11 | Stop reason: HOSPADM

## 2018-06-11 RX ORDER — ASPIRIN 325 MG
325 TABLET ORAL
Status: DISCONTINUED | OUTPATIENT
Start: 2018-06-11 | End: 2018-06-11 | Stop reason: HOSPADM

## 2018-06-11 RX ORDER — NALOXONE HYDROCHLORIDE 0.4 MG/ML
0.4 INJECTION, SOLUTION INTRAMUSCULAR; INTRAVENOUS; SUBCUTANEOUS EVERY 5 MIN PRN
Status: DISCONTINUED | OUTPATIENT
Start: 2018-06-11 | End: 2018-06-11 | Stop reason: HOSPADM

## 2018-06-11 RX ORDER — PROTAMINE SULFATE 10 MG/ML
25-100 INJECTION, SOLUTION INTRAVENOUS EVERY 5 MIN PRN
Status: DISCONTINUED | OUTPATIENT
Start: 2018-06-11 | End: 2018-06-11 | Stop reason: HOSPADM

## 2018-06-11 RX ORDER — FENTANYL CITRATE 50 UG/ML
INJECTION, SOLUTION INTRAMUSCULAR; INTRAVENOUS
Status: DISCONTINUED
Start: 2018-06-11 | End: 2018-06-11 | Stop reason: HOSPADM

## 2018-06-11 RX ORDER — NITROGLYCERIN 5 MG/ML
VIAL (ML) INTRAVENOUS
Status: DISCONTINUED
Start: 2018-06-11 | End: 2018-06-11 | Stop reason: HOSPADM

## 2018-06-11 RX ORDER — FENTANYL CITRATE 50 UG/ML
25-50 INJECTION, SOLUTION INTRAMUSCULAR; INTRAVENOUS
Status: DISPENSED | OUTPATIENT
Start: 2018-06-11 | End: 2018-06-12

## 2018-06-11 RX ORDER — SODIUM CHLORIDE 9 MG/ML
INJECTION, SOLUTION INTRAVENOUS CONTINUOUS
Status: DISCONTINUED | OUTPATIENT
Start: 2018-06-11 | End: 2018-06-11 | Stop reason: HOSPADM

## 2018-06-11 RX ORDER — FUROSEMIDE 10 MG/ML
20-100 INJECTION INTRAMUSCULAR; INTRAVENOUS
Status: DISCONTINUED | OUTPATIENT
Start: 2018-06-11 | End: 2018-06-11 | Stop reason: HOSPADM

## 2018-06-11 RX ORDER — PROTAMINE SULFATE 10 MG/ML
1-5 INJECTION, SOLUTION INTRAVENOUS
Status: DISCONTINUED | OUTPATIENT
Start: 2018-06-11 | End: 2018-06-11 | Stop reason: HOSPADM

## 2018-06-11 RX ORDER — CLOPIDOGREL BISULFATE 75 MG/1
300-600 TABLET ORAL
Status: DISCONTINUED | OUTPATIENT
Start: 2018-06-11 | End: 2018-06-11 | Stop reason: HOSPADM

## 2018-06-11 RX ORDER — LIDOCAINE HYDROCHLORIDE 10 MG/ML
30 INJECTION, SOLUTION EPIDURAL; INFILTRATION; INTRACAUDAL; PERINEURAL
Status: DISCONTINUED | OUTPATIENT
Start: 2018-06-11 | End: 2018-06-11 | Stop reason: HOSPADM

## 2018-06-11 RX ORDER — IOPAMIDOL 755 MG/ML
100 INJECTION, SOLUTION INTRAVASCULAR ONCE
Status: COMPLETED | OUTPATIENT
Start: 2018-06-11 | End: 2018-06-11

## 2018-06-11 RX ORDER — VERAPAMIL HYDROCHLORIDE 2.5 MG/ML
1-2.5 INJECTION, SOLUTION INTRAVENOUS
Status: COMPLETED | OUTPATIENT
Start: 2018-06-11 | End: 2018-06-11

## 2018-06-11 RX ORDER — FENTANYL CITRATE 50 UG/ML
25-50 INJECTION, SOLUTION INTRAMUSCULAR; INTRAVENOUS
Status: DISCONTINUED | OUTPATIENT
Start: 2018-06-11 | End: 2018-06-11 | Stop reason: HOSPADM

## 2018-06-11 RX ORDER — PRASUGREL 10 MG/1
10-60 TABLET, FILM COATED ORAL
Status: DISCONTINUED | OUTPATIENT
Start: 2018-06-11 | End: 2018-06-11 | Stop reason: HOSPADM

## 2018-06-11 RX ORDER — ADENOSINE 3 MG/ML
12-12000 INJECTION, SOLUTION INTRAVENOUS
Status: DISCONTINUED | OUTPATIENT
Start: 2018-06-11 | End: 2018-06-11 | Stop reason: HOSPADM

## 2018-06-11 RX ADMIN — SODIUM CHLORIDE: 9 INJECTION, SOLUTION INTRAVENOUS at 09:18

## 2018-06-11 RX ADMIN — FENTANYL CITRATE 100 MCG: 50 INJECTION INTRAMUSCULAR; INTRAVENOUS at 13:29

## 2018-06-11 RX ADMIN — ASPIRIN 325 MG: 325 TABLET, DELAYED RELEASE ORAL at 08:43

## 2018-06-11 RX ADMIN — NITROGLYCERIN 0.07 MCG/KG/MIN: 20 INJECTION INTRAVENOUS at 09:16

## 2018-06-11 RX ADMIN — NITROGLYCERIN 0.4 MG: 0.4 TABLET SUBLINGUAL at 08:47

## 2018-06-11 RX ADMIN — FENTANYL CITRATE 50 MCG: 50 INJECTION INTRAMUSCULAR; INTRAVENOUS at 14:13

## 2018-06-11 RX ADMIN — NITROGLYCERIN 200 MCG: 5 INJECTION, SOLUTION INTRAVENOUS at 13:28

## 2018-06-11 RX ADMIN — ACETYLCHOLINE CHLORIDE 50 MCG: KIT at 13:59

## 2018-06-11 RX ADMIN — MIDAZOLAM HYDROCHLORIDE 1 MG: 1 INJECTION, SOLUTION INTRAMUSCULAR; INTRAVENOUS at 23:42

## 2018-06-11 RX ADMIN — HEPARIN SODIUM 5000 UNITS: 1000 INJECTION, SOLUTION INTRAVENOUS; SUBCUTANEOUS at 13:30

## 2018-06-11 RX ADMIN — FENTANYL CITRATE 25 MCG: 50 INJECTION INTRAMUSCULAR; INTRAVENOUS at 23:40

## 2018-06-11 RX ADMIN — VERAPAMIL HYDROCHLORIDE 2.5 MG: 2.5 INJECTION, SOLUTION INTRAVENOUS at 13:29

## 2018-06-11 RX ADMIN — NITROGLYCERIN 0.4 MG: 0.4 TABLET SUBLINGUAL at 08:23

## 2018-06-11 RX ADMIN — MIDAZOLAM 1 MG: 1 INJECTION INTRAMUSCULAR; INTRAVENOUS at 14:12

## 2018-06-11 RX ADMIN — HEPARIN SODIUM 5000 UNITS: 1000 INJECTION, SOLUTION INTRAVENOUS; SUBCUTANEOUS at 14:08

## 2018-06-11 RX ADMIN — LIDOCAINE HYDROCHLORIDE 6 ML: 10 INJECTION, SOLUTION INFILTRATION; PERINEURAL at 13:45

## 2018-06-11 RX ADMIN — SODIUM CHLORIDE: 9 INJECTION, SOLUTION INTRAVENOUS at 15:56

## 2018-06-11 RX ADMIN — ATORVASTATIN CALCIUM 40 MG: 40 TABLET, FILM COATED ORAL at 08:43

## 2018-06-11 RX ADMIN — CLOPIDOGREL 75 MG: 75 TABLET, FILM COATED ORAL at 08:43

## 2018-06-11 RX ADMIN — HEPARIN SODIUM 1000 UNITS/HR: 10000 INJECTION, SOLUTION INTRAVENOUS at 02:17

## 2018-06-11 RX ADMIN — ACETYLCHOLINE CHLORIDE 20 MCG: KIT at 13:45

## 2018-06-11 RX ADMIN — MIDAZOLAM 2 MG: 1 INJECTION INTRAMUSCULAR; INTRAVENOUS at 13:30

## 2018-06-11 RX ADMIN — IOPAMIDOL 233 ML: 755 INJECTION, SOLUTION INTRAVASCULAR at 13:00

## 2018-06-11 ASSESSMENT — ACTIVITIES OF DAILY LIVING (ADL)
ADLS_ACUITY_SCORE: 9

## 2018-06-11 NOTE — PROGRESS NOTES
[facility]    Hospitalist Progress Note  Provider : Rossana Aguayo MD  Date of Service (when I saw the patient): 06/11/2018    Assessment & Plan   Jeremy Lainez is a 42 year old male without significant PMH, or strong family hx of CAD, who presents with 10 days of intermittent substernal chest pain not always associated with exertion.  However workup in the emergency room reveals slightly elevated troponin as well as a abnormal EKG with dynamic changes with T-wave inversions in lead V1 and 5 concerning for LAD ischemia. He has been started on heparin drip and cardiology was consulted and patient had coronary angiogram and resulted as follows: Moderate proximal LAD stenosis which is nonflow limiting (FFR 0.84)disease and no obstructive disease elsewhere. Interventional radiologist recommended that given the patient's clinical presentation and moderate LAD stenosis that was recommended that patient be on dual antiplatelet therapy for the next 12 months and risk factor control to prevent progression of patient's coronary artery disease. Patient was encouraged to stop smoking. Patient had evidence of ST elevation with palpitations during stress test today. Restarted on heparin drip yesterday. This morning he developed episode of chest pain with ST elevation. Started on nitroglycerin drip. Cardiology planning to do angiogram today.     Hospital Active Problem List, assessment and Plan:    1.  STEMI: Initially admitted for an Non-STEMI. He underwent coronary angiogram on 6/8  --Patient was noted to have EKG changes concerning for ST elevation.    --Echocardiogram showed ejection fraction at 55% with mild mid/high lateral, anterior regional wall motion abnormalities. Troponin was elevated.    --He had stress test on 6/10 and a developed palpitations with ST elevation.  Patient was restarted on heparin drip yesterday. This morning he had another episode of chest pain with ST elevation. Discussed with  cardiology.  Started on nitroglycerin drip per cardiology recommendations. Scheduled for angiogram today.   --Continue aspirin, Lipitor, Plavix, metoprolol, heparin drip  --Case was discussed with cardiology. Appreciate input    2. Hypertension: Controlled, continue beta-blocker    3. Tobacco smoking: Patient with counseled to quit smoking    Code status: Full Code    Pain Assessment:  Current Pain Score 6/11/2018   Patient currently in pain? denies   Pain score (0-10) -   Pain location -   Pain descriptors -   Jeremy blackwell pain level was assessed and he currently denies pain.    Incidental Findings: None  DVT Prophylaxis: Pneumatic Compression Devices.  Also on heparin drip  Code Status: Full code  Disposition: Expected discharge: TBD.  Angiogram planned for today    Rossana Aguayo MD    Interval History   Patient seen and examined. He stated that he is feeling better.  He had stress test this morning had not had palpitations during stress test.  Now chest pain improving.  He denies vomiting.  No shortness of breath.  No fever.  Case discussed with cardiology and was started on heparin drip.    -Data reviewed today: I reviewed all new labs and imaging results over the last 24 hours. I personally reviewed    Physical Exam   Temp: 96.6  F (35.9  C) Temp src: Oral BP: 110/70 Pulse: 58 Heart Rate: 60 Resp: 12 SpO2: 96 % O2 Device: None (Room air) Oxygen Delivery: 2 LPM  Vitals:    06/08/18 1036 06/08/18 1600   Weight: 83.9 kg (185 lb) 82.7 kg (182 lb 4.8 oz)     Vital Signs with Ranges  Temp:  [96.5  F (35.8  C)-96.8  F (36  C)] 96.6  F (35.9  C)  Pulse:  [58-62] 58  Heart Rate:  [54-78] 60  Resp:  [12-20] 12  BP: (101-172)/() 110/70  SpO2:  [95 %-100 %] 96 %  I/O last 3 completed shifts:  In: 483 [P.O.:320; I.V.:163]  Out: -     GEN:  Alert, oriented x 3, appears comfortable, NAD.  HEENT:  Normocephalic/atraumatic, no scleral icterus, no nasal discharge, mouth moist.  CV:  Regular rate and rhythm, no murmur or  JVD.  S1 + S2 noted, no S3 or S4.  LUNGS:  Clear to auscultation bilaterally without rales/rhonchi/wheezing/retractions.  Symmetric chest rise on inhalation noted.  ABD:  Active bowel sounds, soft, non-tender/non-distended.  No rebound/guarding/rigidity.  EXT:  No edema or cyanosis.  Hands/feet warm to touch with good signs of peripheral perfusion.  No joint synovitis noted.  SKIN:  Dry to touch, no exanthems noted in the visualized areas.  NEURO:  Symmetric muscle strength, sensation to touch grossly intact.  No new focal deficits appreciated.    Medications     adenosine (ADENOSCAN) 90mg in sodium chloride 0.9% 90 mL - for Cath LAB (FFR)       bivalirudin (ANGIOMAX) infusion ADULT       DOBUTamine       DOPamine       HEParin Stopped (06/11/18 1314)     nitroGLYcerin 0.07 mcg/kg/min (06/11/18 0916)     nitroGLYcerin Stopped (06/11/18 1314)     nitroPRUsside (NIPRIDE) IV infusion ADULT/PEDS GREATER than or EQUAL to 45 kg std conc       - MEDICATION INSTRUCTIONS -       - MEDICATION INSTRUCTIONS -       phenylephrine IV infusion ADULT       sodium chloride 150 mL/hr at 06/11/18 0918       acetylcholine (MIOCHOL-E) 2000 mcg in  mL  50 mcg INTRACORONARY Once    Followed by     acetylcholine (MIOCHOL-E) 2000 mcg in  mL  50 mcg INTRACORONARY Once     acetylcholine (MIOCHOL-E) 2000 mcg in  mL  50 mcg INTRACORONARY Once     aspirin  325 mg Oral Daily     atorvastatin  40 mg Oral Daily     clopidogrel  75 mg Oral Daily     fentaNYL (PF)         heparin (porcine)         iopamidol  100 mL INTRA-ARTERIAL Once     metoprolol succinate  12.5 mg Oral Daily     midazolam         sodium chloride (PF)  3 mL Intracatheter Q8H       Data     Recent Labs  Lab 06/10/18  1456 06/10/18  1208 06/10/18  1006 06/10/18  0620  06/09/18  0617 06/08/18  1046   WBC  --   --   --  7.4  --   --  7.0   HGB  --   --   --  15.1  --   --  16.0   MCV  --   --   --  92  --   --  92   PLT  --   --   --  211  --   --  215   INR  --   --    --   --   --   --  0.92   NA  --   --   --   --   --   --  141   POTASSIUM  --   --   --   --   --   --  3.7   CHLORIDE  --   --   --   --   --   --  108   CO2  --   --   --   --   --   --  29   BUN  --   --   --   --   --   --  10   CR  --   --   --   --   --   --  0.86   ANIONGAP  --   --   --   --   --   --  4   MARIZA  --   --   --   --   --   --  9.0   GLC  --   --   --   --   --   --  90   ALT  --   --   --   --   --  16  --    AST  --   --   --   --   --  16  --    TROPI 0.061* 0.071* 0.079* 0.097*  < > 0.228* 0.071*   < > = values in this interval not displayed.    No results found for this or any previous visit (from the past 24 hour(s)).

## 2018-06-11 NOTE — PLAN OF CARE
"Problem: Patient Care Overview  Goal: Plan of Care/Patient Progress Review  Outcome: No Change  VSS denies chest pain except for on episode of what patient describe as \"uncomfortable and like my heart skip a beat\". Stat EKG ordered reading normal sinus rhythm with inverted T waves, this episode was short lives and patient was pain free rest of the shift. Continues on heparin gtt at 10 ml/hr. Trops trending down plan is to have coronary angio today. Tele SB with inverted T waves      "

## 2018-06-11 NOTE — PLAN OF CARE
"Problem: Cardiac: ACS (Acute Coronary Syndrome) (Adult)  Goal: Signs and Symptoms of Listed Potential Problems Will be Absent, Minimized or Managed (Cardiac: ACS)  Signs and symptoms of listed potential problems will be absent, minimized or managed by discharge/transition of care (reference Cardiac: ACS (Acute Coronary Syndrome) (Adult) CPG).   Outcome: Improving  At aprox 0820 am pt c/o midsternal chest pain. \"burning sensation with numbness in left arm\". B/p 172/115 hr 70. Tele did show ST elevation. EKG was ordered. Dr. Aguayo and Dr. Lopez were notified who both came to see pt.  Pt was clammy.  CP resolved after one nitro. B/p did come down. 2nd nitro given to keep arteries open then nitro gtt started. Pt remains cp free. A&OX4. Informed to call for assist oob. For safety. Hep gtt infusing. Awaiting angio.       "

## 2018-06-11 NOTE — PROGRESS NOTES
R Radial site with bruising from previous TR band procedure done on Friday.  Hematoma  Formed @ proximal site over bruise and seen by cath lab RN.  Manual pressure applied and hematoma dissipated.  R Groin site with oozing and also evaluated by cath lab RN.  Dressing changed and Oozing stopped.  Dr Arreola aware of above.  Dr Arreola also notified of BP creeping up to 130s/90s and HR 40s. Will check PTT. Pain free. Instructed on Radial and groin precautions.

## 2018-06-12 ENCOUNTER — APPOINTMENT (OUTPATIENT)
Dept: OCCUPATIONAL THERAPY | Facility: CLINIC | Age: 43
DRG: 247 | End: 2018-06-12
Attending: INTERNAL MEDICINE
Payer: COMMERCIAL

## 2018-06-12 VITALS
OXYGEN SATURATION: 94 % | WEIGHT: 182.3 LBS | HEIGHT: 74 IN | RESPIRATION RATE: 12 BRPM | HEART RATE: 58 BPM | BODY MASS INDEX: 23.4 KG/M2 | DIASTOLIC BLOOD PRESSURE: 62 MMHG | SYSTOLIC BLOOD PRESSURE: 102 MMHG | TEMPERATURE: 98.3 F

## 2018-06-12 LAB
ANION GAP SERPL CALCULATED.3IONS-SCNC: 6 MMOL/L (ref 3–14)
BASOPHILS # BLD AUTO: 0 10E9/L (ref 0–0.2)
BASOPHILS NFR BLD AUTO: 0.5 %
BUN SERPL-MCNC: 11 MG/DL (ref 7–30)
CALCIUM SERPL-MCNC: 8.7 MG/DL (ref 8.5–10.1)
CHLORIDE SERPL-SCNC: 105 MMOL/L (ref 94–109)
CO2 SERPL-SCNC: 28 MMOL/L (ref 20–32)
CREAT SERPL-MCNC: 0.81 MG/DL (ref 0.66–1.25)
DIFFERENTIAL METHOD BLD: NORMAL
EOSINOPHIL # BLD AUTO: 0.1 10E9/L (ref 0–0.7)
EOSINOPHIL NFR BLD AUTO: 1 %
ERYTHROCYTE [DISTWIDTH] IN BLOOD BY AUTOMATED COUNT: 12.1 % (ref 10–15)
GFR SERPL CREATININE-BSD FRML MDRD: >90 ML/MIN/1.7M2
GLUCOSE BLDC GLUCOMTR-MCNC: 78 MG/DL (ref 70–99)
GLUCOSE BLDC GLUCOMTR-MCNC: 91 MG/DL (ref 70–99)
GLUCOSE BLDC GLUCOMTR-MCNC: 94 MG/DL (ref 70–99)
GLUCOSE SERPL-MCNC: 83 MG/DL (ref 70–99)
HCT VFR BLD AUTO: 45 % (ref 40–53)
HGB BLD-MCNC: 15.8 G/DL (ref 13.3–17.7)
IMM GRANULOCYTES # BLD: 0 10E9/L (ref 0–0.4)
IMM GRANULOCYTES NFR BLD: 0.4 %
LYMPHOCYTES # BLD AUTO: 1.1 10E9/L (ref 0.8–5.3)
LYMPHOCYTES NFR BLD AUTO: 13 %
MCH RBC QN AUTO: 32.2 PG (ref 26.5–33)
MCHC RBC AUTO-ENTMCNC: 35.1 G/DL (ref 31.5–36.5)
MCV RBC AUTO: 92 FL (ref 78–100)
MONOCYTES # BLD AUTO: 0.5 10E9/L (ref 0–1.3)
MONOCYTES NFR BLD AUTO: 5.6 %
NEUTROPHILS # BLD AUTO: 6.7 10E9/L (ref 1.6–8.3)
NEUTROPHILS NFR BLD AUTO: 79.5 %
NRBC # BLD AUTO: 0 10*3/UL
NRBC BLD AUTO-RTO: 0 /100
PLATELET # BLD AUTO: 209 10E9/L (ref 150–450)
POTASSIUM SERPL-SCNC: 3.8 MMOL/L (ref 3.4–5.3)
RBC # BLD AUTO: 4.91 10E12/L (ref 4.4–5.9)
SODIUM SERPL-SCNC: 139 MMOL/L (ref 133–144)
WBC # BLD AUTO: 8.4 10E9/L (ref 4–11)

## 2018-06-12 PROCEDURE — 40000133 ZZH STATISTIC OT WARD VISIT: Performed by: REHABILITATION PRACTITIONER

## 2018-06-12 PROCEDURE — 80048 BASIC METABOLIC PNL TOTAL CA: CPT | Performed by: INTERNAL MEDICINE

## 2018-06-12 PROCEDURE — 97110 THERAPEUTIC EXERCISES: CPT | Mod: GO | Performed by: REHABILITATION PRACTITIONER

## 2018-06-12 PROCEDURE — 25000132 ZZH RX MED GY IP 250 OP 250 PS 637: Performed by: INTERNAL MEDICINE

## 2018-06-12 PROCEDURE — 25000132 ZZH RX MED GY IP 250 OP 250 PS 637: Performed by: PHYSICIAN ASSISTANT

## 2018-06-12 PROCEDURE — 99238 HOSP IP/OBS DSCHRG MGMT 30/<: CPT | Performed by: INTERNAL MEDICINE

## 2018-06-12 PROCEDURE — 85025 COMPLETE CBC W/AUTO DIFF WBC: CPT | Performed by: INTERNAL MEDICINE

## 2018-06-12 PROCEDURE — 36415 COLL VENOUS BLD VENIPUNCTURE: CPT | Performed by: INTERNAL MEDICINE

## 2018-06-12 PROCEDURE — 40000275 ZZH STATISTIC RCP TIME EA 10 MIN

## 2018-06-12 PROCEDURE — 99233 SBSQ HOSP IP/OBS HIGH 50: CPT | Performed by: INTERNAL MEDICINE

## 2018-06-12 PROCEDURE — 00000146 ZZHCL STATISTIC GLUCOSE BY METER IP

## 2018-06-12 PROCEDURE — 93005 ELECTROCARDIOGRAM TRACING: CPT

## 2018-06-12 PROCEDURE — 97165 OT EVAL LOW COMPLEX 30 MIN: CPT | Mod: GO | Performed by: REHABILITATION PRACTITIONER

## 2018-06-12 RX ORDER — ATORVASTATIN CALCIUM 40 MG/1
40 TABLET, FILM COATED ORAL AT BEDTIME
Qty: 30 TABLET | Refills: 0 | Status: SHIPPED | OUTPATIENT
Start: 2018-06-12 | End: 2018-06-26

## 2018-06-12 RX ORDER — CLOPIDOGREL BISULFATE 75 MG/1
75 TABLET ORAL DAILY
Qty: 30 TABLET | Refills: 1 | Status: SHIPPED | OUTPATIENT
Start: 2018-06-13 | End: 2018-06-26

## 2018-06-12 RX ORDER — ISOSORBIDE MONONITRATE 30 MG/1
30 TABLET, EXTENDED RELEASE ORAL DAILY
Status: DISCONTINUED | OUTPATIENT
Start: 2018-06-12 | End: 2018-06-12 | Stop reason: HOSPADM

## 2018-06-12 RX ORDER — ISOSORBIDE MONONITRATE 30 MG/1
30 TABLET, EXTENDED RELEASE ORAL DAILY
Qty: 30 TABLET | Refills: 0 | Status: SHIPPED | OUTPATIENT
Start: 2018-06-13 | End: 2018-06-26

## 2018-06-12 RX ADMIN — ISOSORBIDE MONONITRATE 30 MG: 30 TABLET, EXTENDED RELEASE ORAL at 13:32

## 2018-06-12 RX ADMIN — ATORVASTATIN CALCIUM 40 MG: 40 TABLET, FILM COATED ORAL at 09:40

## 2018-06-12 RX ADMIN — CLOPIDOGREL 75 MG: 75 TABLET, FILM COATED ORAL at 09:39

## 2018-06-12 RX ADMIN — ASPIRIN 81 MG: 81 TABLET, COATED ORAL at 09:40

## 2018-06-12 NOTE — DISCHARGE SUMMARY
[facility]  Discharge Summary        Jeremy Lainez MRN# [id number]   YOB: 1975 Age: 42 year old     Date of Admission:  6/8/2018  Date of Discharge:  6/12/2018  Admitting Physician:  Saad Clifford MD  Discharge Physician: Salvatore Johnson MD  Discharging Service: Hospitalist     Primary Provider: No Ref-Primary, Physician  Primary Care Physician Phone Number: None         Discharge Diagnoses/Problem Oriented Hospital Course (Providers):    Jeremy Lainez was admitted on 6/8/2018 by Saad Clifford MD and I would refer you to their history and physical.      Patient was seen and examined on the day of discharge    Discharge diagnoses:  1. Non-STEMI, initial encounter  2. STEMI, occurred during hospitalization course  3. Hypertension  4. Tobaccoism, educated and counseled to quit    Hospital course:  Jeremy Lainez is a 42 year old male without significant PMH, or strong family hx of CAD, who presents with 10 days of intermittent substernal chest pain not always associated with exertion.  However workup in the emergency room reveals slightly elevated troponin as well as a abnormal EKG with dynamic changes with T-wave inversions in lead V1 and 5 concerning for LAD ischemia. He has been started on heparin drip and cardiology was consulted and patient had coronary angiogram and resulted as follows: Moderate proximal LAD stenosis which is nonflow limiting (FFR 0.84)disease and no obstructive disease elsewhere. Interventional radiologist recommended that given the patient's clinical presentation and moderate LAD stenosis that was recommended that patient be on dual antiplatelet therapy for the next 12 months and risk factor control to prevent progression of patient's coronary artery disease. Patient was encouraged to stop smoking.  On the a.m. of 6/11/18, patient developed recurrent chest pain and EKG was consistent with an anterior STEMI.  Patient was brought to the cardiac Cath Lab and  underwent a repeat angiogram.  There were no change in the mid LAD lesion but with an acetylcholine challenge, he did have significant vasospasm of the proximal LAD in which a KATLYN stent was placed.  Postprocedure, patient remained chest pain-free.  It is still recommended that he goes on dual antiplatelet agents for at least a year.  During his hospitalization he was started on a statin.  Additionally, Toprol-XL was started but due to some significant bradycardia in the 50s, and the fact that he did not have significant obstructive coronary artery disease with the exception of vasospasm, this was transitioned over to p.o. Imdur instead.  He was discharged on Imdur 30 mg p.o. daily but this could be titrated up to 60 mg as needed.  Also may consider the addition of Norvasc.    Disposition: Discharged home in stable condition.  Outpatient cardiac rehab has been ordered.  Follow-up advanced practice provider and cardiology clinic in 2 weeks.             Pending Results:        Unresulted Labs Ordered in the Past 30 Days of this Admission     No orders found from 4/9/2018 to 6/9/2018.            Discharge Instructions and Follow-Up:      Follow-up Appointments     Follow-up and recommended labs and tests        Follow up with primary care provider, Physician No Ref-Primary, within 7   days to evaluate medication change and for hospital follow- up.  No follow   up labs or test are needed.                      Discharge Disposition:      Discharged to home         Discharge Medications:        Current Discharge Medication List      START taking these medications    Details   aspirin 81 MG EC tablet Take 1 tablet (81 mg) by mouth daily    Associated Diagnoses: Acute ST elevation myocardial infarction (STEMI) involving left anterior descending (LAD) coronary artery (H)      atorvastatin (LIPITOR) 40 MG tablet Take 1 tablet (40 mg) by mouth At Bedtime  Qty: 30 tablet, Refills: 0    Associated Diagnoses: Acute ST elevation  myocardial infarction (STEMI) involving left anterior descending (LAD) coronary artery (H)      clopidogrel (PLAVIX) 75 MG tablet Take 1 tablet (75 mg) by mouth daily  Qty: 30 tablet, Refills: 1    Associated Diagnoses: Acute ST elevation myocardial infarction (STEMI) involving left anterior descending (LAD) coronary artery (H)      isosorbide mononitrate (IMDUR) 30 MG 24 hr tablet Take 1 tablet (30 mg) by mouth daily  Qty: 30 tablet, Refills: 0    Associated Diagnoses: Acute ST elevation myocardial infarction (STEMI) involving left anterior descending (LAD) coronary artery (H)               Allergies:       No Known Allergies        Consultations This Hospital Stay:      Consultation during this admission received from cardiology           Image Results From This Hospital Stay (For Non-EPIC Providers):        Results for orders placed or performed during the hospital encounter of 06/08/18   XR Chest 2 Views    Narrative    XR CHEST 2 VW 6/8/2018 11:44 AM    HISTORY: Chest pain.    COMPARISON: None.    FINDINGS: No airspace consolidation, pleural effusion or pneumothorax.  Normal heart size.      Impression    IMPRESSION: No acute cardiopulmonary abnormality.    JACOB KILPATRICK MD

## 2018-06-12 NOTE — PLAN OF CARE
Problem: Patient Care Overview  Goal: Plan of Care/Patient Progress Review  OT/CR- eval and treatment completed. Patient is a 42 year old male with history of tobacco dependence, who was admitted on 6/8/2018 w/ about 1 wk intermittent SSCP w/ assoc diaphoresis.  Sent from PMD to ER due to abnormal ECG concerning for anterior T inversions. Mild troponin elevation.  Hypertensive. Cardiac catheterization 6/9/18 with 60% OM1 lesion and 40-50% p-mLAD lesion, negative FFR (0.84) across the latter. Recurrent chest discomfort over the weekend. Echo 6/10/18 without definitive WMAs.  Heparin resumed. Repeat cath 6/11/18: 60% prox LAD with e/o plaque rupture. Acetylcholine provocation study with spasm in the prox LAD lesion without significant spasm elsewhere. Given the results and recurrent SINDI while on DAPT and medical therapy he underwent 3.5 x 28 mm synergy KATLYN to prox LAD.     Discharge Planner OT   Patient plan for discharge: home today, agreeable to outpatient cardiac rehab  Current status:  Completed cardiac education in cardiac risk factors, signs and symptoms of exercise intolerance, advancement of activity following a MI and stent placement, benefits to aerobic exercise and outpatient cardiac rehab, Omni Exertion and shortness of breath scale as well as progression of exercise program at home. Patient and spouse asked appropriate questions and verbalized understanding afterwards. Patient ambulated 5 minutes without c/o of cardiac symptoms or exercise intolerance. Cardiology team arrived at end of walk and aware of BP changes;    At rest BP was 172/92, HR: 67.    At end of walk, BP was 138/102, HR: 78.  Per cardiology patient is expected to dc home later today.  Barriers to return to prior living situation: none from rehab standpoint  Recommendations for discharge: home with family and spouse to A with IADL's, heavier household and yard activity as well as transition into outpatient cardiac rehab  Rationale for  recommendations: patient has met needed goals, will dc from rehab services.        Entered by: Arelis Torres 06/12/2018 12:40 PM       Cardiac Rehab Discharge Summary    Reason for therapy discharge:    All goals and outcomes met, no further needs identified.    Progress towards therapy goal(s). See goals on Care Plan in [facility] electronic health record for goal details.  Goals met    Therapy recommendation(s):    Continued therapy is recommended.  Rationale/Recommendations:  outpatient cardiac rehab.  Continue home exercise program.

## 2018-06-12 NOTE — PROVIDER NOTIFICATION
Spoke with Dr. Bravo on-call cardiologist regarding oozing from groin site (sheath still in), informed 2 gauze pads have been saturated since last dressing change hours ago.  Last PTT @ 1815 122, no orders for further PTT.  Verbal order received to recheck PTT hourly until PTT reaches 50 or less and can pull groin sheath.  No new orders received for oozing, continue to monitor site.  Radial TR band still on, MD advised not to start removal process on TR bank until PTT also reaches 50 or less.

## 2018-06-12 NOTE — PROGRESS NOTES
[facility]  Cardiology Progress Note    Outpatient cardiologist: Dr. Lopez going forward    Date of Service (when I saw the patient): 06/12/2018    Summary: Jeremy Lainez is a 42 year old male with history of tobacco dependence, who was admitted on 6/8/2018 w/ about 1 wk intermittent SSCP w/ assoc diaphoresis.  Sent from PMD to ER due to abnormal ECG concerning for anterior T inversions. Mild troponin elevation.  Hypertensive. Cardiac catheterization 6/9/18 with 60% OM1 lesion and 40-50% p-mLAD lesion, negative FFR (0.84) across the latter.  Recurrent chest discomfort over the weekend. Echo 6/10/18 without definitive WMAs.  Heparin resumed.   Repeat cath 6/11/18: 60% prox LAD with e/o plaque rupture. Acetylcholine provocation study with spasm in the prox LAD lesion without significant spasm elsewhere. Given the results and recurrent SINDI while on DAPT and medical therapy he underwent 3.5 x 28 mm synergy KATLYN to prox LAD.       Interval History   Tele: SB, 50s at rest  No chest pain, walked and had no recurrent sxs  Groin site and radial site ok.        Assessment & Plan   STEMI  Vasospasm  -troponin peak 0.1  -anterior ST elevation  -6/8/18 urgent cath: 40-50% pLAD and 60% OM1(small vessel)  LAD not found to be flow limiting per FFR (0.84).  No PCI  -developed recurrent CP & palpitations w/ associated ST elevations in V2-V5 w/ T-wave inversions  -ECHO 6/10/18 LVEF 55% possible Mild mid/high lateral, anterior RWMA. Though no definitive WMAs  -Repeat cath 6/11/18: 60% prox LAD. Acetylcholine provocation study with spasm in the prox LAD lesion without significant spasm elsewhere. Given the results and recurrent SINDI while on DAPT and medical therapy he underwent 3.5 x 28 mm synergy KATLYN to prox LAD.   -No pain currently, nor with ambulation  -ASA, Plavix, BB, statin. He is bradycardic on Toprol XL 12.5 mg and it was held this AM. Will switch to Imdur 30 mg for BP and spasm. Can use Norvasc if needed in the  future  -repeat EKG today  -Cardiac rehab  -No smoking  -Mediterranean style diet discussed     HTN  -elevated on admit  -now better controlled w/ Metoprolol XL 12.5 mg. He is bradycardic on Toprol XL 12.5 mg and it was held this AM. Will switch to Imdur 30 mg for BP and spasm. Could use Norvasc if needed in the future       Tobacco dependence  -quit at time of admit  -motivated to stay off cigarettes     Statin started  -Baseline FLP 6/9/18: , HDL 46, LDL 71,   -Repeat FLP 6/11/18: , HDL 51, LDL 79, TG 52  -Started atorvastatin 40 mg  -will need lipid panel in 2 months      DISPO: ok to DC today  Restrictions discussed      Follow up:  [facility] in 1-2 weeks with BMP  Dr. Lopez in 2 months with FLP/ALT      Tye Bran PA-C      Patient Active Problem List   Diagnosis     AMI (acute myocardial infarction)       Physical Exam   Temp: 98.3  F (36.8  C) Temp src: Oral BP: 116/83   Heart Rate: 52   SpO2: 97 % O2 Device: None (Room air)    Vitals:    06/08/18 1036 06/08/18 1600   Weight: 83.9 kg (185 lb) 82.7 kg (182 lb 4.8 oz)     Vital Signs with Ranges  Temp:  [97.6  F (36.4  C)-98.3  F (36.8  C)] 98.3  F (36.8  C)  Heart Rate:  [47-68] 52  BP: ()/(68-97) 116/83  SpO2:  [93 %-100 %] 97 %  I/O last 3 completed shifts:  In: 735 [I.V.:735]  Out: 1250 [Urine:1250]    Constitutional: NAD.   Respiratory: CTAB.   Cardiovascular: RRR, s1s2, no sig murmur  GI: soft, BS+  Skin: warm, no rashes  Musculoskeletal: Moving all extremities. RRA site: reverse jose test ok, RFA site: no hematoma, no bruit  Neurologic: Alert, oriented x 3  Neuropsychiatric: Normal affect       Data     Recent Labs  Lab 06/12/18  0608 06/10/18  1456 06/10/18  1208 06/10/18  1006 06/10/18  0620  06/09/18  0617 06/08/18  1046   WBC 8.4  --   --   --  7.4  --   --  7.0   HGB 15.8  --   --   --  15.1  --   --  16.0   MCV 92  --   --   --  92  --   --  92     --   --   --  211  --   --  215   INR  --   --   --   --   --   --   --   0.92     --   --   --   --   --   --  141   POTASSIUM 3.8  --   --   --   --   --   --  3.7   CHLORIDE 105  --   --   --   --   --   --  108   CO2 28  --   --   --   --   --   --  29   BUN 11  --   --   --   --   --   --  10   CR 0.81  --   --   --   --   --   --  0.86   ANIONGAP 6  --   --   --   --   --   --  4   MARIZA 8.7  --   --   --   --   --   --  9.0   GLC 83  --   --   --   --   --   --  90   ALT  --   --   --   --   --   --  16  --    AST  --   --   --   --   --   --  16  --    TROPI  --  0.061* 0.071* 0.079* 0.097*  < > 0.228* 0.071*   < > = values in this interval not displayed.  No results found for this or any previous visit (from the past 24 hour(s)).    6/8/18 echo:  Interpretation Summary     The visual ejection fraction is estimated at 55-60%.  Left ventricular diastolic function is normal.  Contrast was used without apparent complications. There is no comparison study  Available.    6/8/18 cath:  Moderate proximal LAD stenosis (40-50%) which is nonflow limiting (FFR 0.84)  disease. The first obtuse marginal branch small vessel with 60% ostial stenosis.    6/10/18 echo:  Interpretation Summary     Limited echo to assess regional wall motion.  Cannot exclude small area of very mildly decreased thickening in the mid-high-  lateral/ant wall on isolated images however these appear possibly off-axis.  No definitive regional wall motion abnormalities are seen. Limited views were  Obtained.    6/11/18 cath:  60% prox LAD. Acetylcholine provocation study with spasm in the prox LAD lesion without significant spasm elsewhere. S/p 3.5 x 28 mm synergy KATLYN to prox LAD.     Medications     - MEDICATION INSTRUCTIONS -       - MEDICATION INSTRUCTIONS -       - MEDICATION INSTRUCTIONS -       Percutaneous Coronary Intervention orders placed (this is information for BPA alerting)       Reason ACE/ARB/ARNI order not selected         aspirin  81 mg Oral Daily     atorvastatin  40 mg Oral Daily     clopidogrel  75  mg Oral Daily     metoprolol succinate  12.5 mg Oral Daily

## 2018-06-12 NOTE — PLAN OF CARE
Problem: Patient Care Overview  Goal: Plan of Care/Patient Progress Review  ICU End of Shift Summary.  For vital signs and complete assessments, please see documentation flowsheets.     Pertinent assessments: Afebrile. Alert and oriented x 4. Denies pain throughout night. Bradycardic in 50's mostly occasionally upper 40's. Right radial TR band removed at 2310 without complications. Educated on use of arm and restrictions. Right groin sheath removal and pressure holding completed at 0030 with no complications. Tolerated well. Educated patient on restrictions. Shifted patients weight hourly, pt voiced getting very tired of laying flat. Log rolled onto side and applied lotion and back rub given. Both radial site and groin site bandaids CDI and without bruising. Radial site from first cath done Friday does have slight bruise, this site is slightly above most recent site. Patient did not really sleep at all tonight given events, comfort and frequent site assessments.   Major Shift Events: TR band removed. Sheath removed.   Plan (Upcoming Events): Continue with current plan of care, Cardiology following. Reinforce education on arm/wrist restrictions and groin site precautions.   Discharge/Transfer Needs: To be determined pending progress.    Bedside Shift Report Completed : yes  Bedside Safety Check Completed: yes

## 2018-06-12 NOTE — PLAN OF CARE
Problem: Patient Care Overview  Goal: Plan of Care/Patient Progress Review  Outcome: Adequate for Discharge Date Met: 06/12/18  D/c info reviewed with pt and verbalized understanding, groin and radial sites CDI, CMS intact. Pt d/c'd home accompanied by wife. Pt to  prescriptions from his pharmacy on way home.

## 2018-06-12 NOTE — CONSULTS
"NUTRITION ASSESSMENT & EDUCATION NOTE    REASON FOR ASSESSMENT:  Nutrition education on heart healthy diet    NUTRITION HISTORY:  Information obtained from patient - full diet hx deferred due to timing of bedside EKG  Home diet: regular  Meals TID. Family at home. Eats relatively \"healthy\" but aware of opportunity to cut back on salt intake, increase fruit and vegetable consumption    CURRENT DIET AND NOURISHMENT ORDER:  Diet: low saturated fat  Current Intake/Tolerance: no issues    ANTHROPOMETRICS  Height: 6' 2\"  Weight: 82 kg   Body mass index is 23.41 kg/(m^2).  Weight Status:  Normal BMI  Ideal body weight: 86.4 kg +/- 10%, 96% of IBW       LABS/MEDICATIONS/PHYSICAL FINDINGS:  Meds reviewed  Labs reviewed:  Cholesterol (mg/dL)   Date Value   06/11/2018 140   06/09/2018 149     HDL Cholesterol (mg/dL)   Date Value   06/11/2018 51   06/09/2018 46     LDL Cholesterol Calculated (mg/dL)   Date Value   06/11/2018 79   06/09/2018 71       MALNUTRITION:  Patient does not meet two of the following criteria necessary for diagnosing malnutrition: significant weight loss, reduced intake, subcutaneous fat loss, muscle loss or fluid retention    NUTRITION DIAGNOSIS:  Food- and nutrition-related knowledge deficit related to heart healthy diet guidelines as evidenced by diet recall and admitted for STEMI    INTERVENTIONS:  Nutrition Prescription:  Recommended cardiac Diet - Na <8911-0776 mg, low saturated fat, dietary fiber >25-30 grams     Implementation:    Assessed learning needs, learning preferences and willingness to learn     Nutrition Education (Content):  a) reviewed the heart healthy diet guidelines, including limiting sodium and saturated fat; emphasis on whole grains, lean proteins, fruits and vegetables and low fat dairy; ample water intake and limited added sugars  b) Discussed rational for limiting Na for CHF and stressed importance of following 2 gm Na guidelines  c) Provided handouts:  1) Tips for Low Na " Diet  2) Heart healthy food choices  3) Heart healthy recipe substitutions    Nutrition Education (Application):  a) Discussed eating habits and recommended alternative food choices  b) Emphasis on fruits and vegetables at each meal and working towards 1/2 the plate; whole grains; lean protein sources at each meal; increased water intake to replace soda consumption; transitioning to sodium free seasonings      Anticipate good compliance with more education at Cardiac rehab    Diet Education - refer to Education Flowsheet    Goals:    Patient verbalizes understanding of diet     Follow Up/Monitoring:    Provided RD contact information for future questions    Recommend Out-Patient Nutrition Referral if further diet instructions are needed - follow up in cardiac rehab for additional education    Progress towards goals will be monitored and evaluated per protocol and Practice Guidelines      Pamela Mesa RDN, LD, CNSC  Pager - 3rd floor/ICU: [phone]  Pager - All other floors: [phone]  Pager - Weekend/holiday: [phone]  Office: [phone]

## 2018-06-12 NOTE — PROGRESS NOTES
Sheath Removal:    Procedure explained to patient. Versed 1mg and Fentanyl 25 mcg given for comfort and relaxation for procedure.  Dressing removed along with sutures removed. Syringe aspirated, no clots noted. Sheath removed at 0006 and manual pressure held until 0030. Patient tolerated well.  No bruising noted at insertion site. Right leg secured to bed with blanket as reminder not to use. Rules regarding no coughing, laughing, sneezing and lifting head discussed with patient.  Understanding verbalized. Will continue to monitor CMS and site for bleeding.

## 2018-06-12 NOTE — PROGRESS NOTES
Patient seen and examined.  Remains chest pain-free this a.m. and has been up ambulatory without any exertional chest discomfort.  Cardiology input appreciated.  Agree with holding Toprol-XL for now and adding Imdur.  Await final recommendations from staff cardiologist to clear patient for discharge.  Agree with outpatient cardiac advance practice provider within a couple weeks and outpatient cardiac rehab. Okay to discharge if okay with cardiology.  We will be happy to electronically prescribe recommended medications to patient's local pharmacy.

## 2018-06-12 NOTE — PROGRESS NOTES
06/12/18 1012   Quick Adds   Type of Visit (Cardiac Rehab Eval)   Living Environment   Lives With spouse;child(ge), dependent  (ages, 13, 12, 10 and 6)   Living Arrangements house   Home Accessibility stairs within home   Number of Stairs Within Home 12   Transportation Available car;family or friend will provide   Self-Care   Usual Activity Tolerance good   Current Activity Tolerance good   Regular Exercise no   Activity/Exercise/Self-Care Comment Patient works in finance for [employer]   Functional Level Prior   Ambulation 0-->independent   Transferring 0-->independent   Toileting 0-->independent   Bathing 0-->independent   Dressing 0-->independent   Eating 0-->independent   Communication 0-->understands/communicates without difficulty   Swallowing 0-->swallows foods/liquids without difficulty   Cognition 0 - no cognition issues reported   General Information   Onset of Illness/Injury or Date of Surgery - Date 06/08/18   Referring Physician Dr. Arreola   Patient/Family Goals Statement to quit smoking   Additional Occupational Profile Info/Pertinent History of Current Problem Patient is a 42 year old male with history of tobacco dependence, who was admitted on 6/8/2018 w/ about 1 wk intermittent SSCP w/ assoc diaphoresis.  Sent from PMD to ER due to abnormal ECG concerning for anterior T inversions. Mild troponin elevation.  Hypertensive. Cardiac catheterization 6/9/18 with 60% OM1 lesion and 40-50% p-mLAD lesion, negative FFR (0.84) across the latter.Recurrent chest discomfort over the weekend. Echo 6/10/18 without definitive WMAs.  Heparin resumed. Repeat cath 6/11/18: 60% prox LAD with e/o plaque rupture. Acetylcholine provocation study with spasm in the prox LAD lesion without significant spasm elsewhere. Given the results and recurrent SINDI while on DAPT and medical therapy he underwent 3.5 x 28 mm synergy KATLYN to prox LAD.    Precautions/Limitations other (see comments)  (stent placement precautions- R radial and L  femoral)   Heart Disease Risk Factors Smoking;Lack of physical activity   Cognitive Status Examination   Orientation orientation to person, place and time   Level of Consciousness alert   Able to Follow Commands WNL/WFL   Personal Safety (Cognitive) WNL/WFL   Memory intact   Visual Perception   Visual Perception No deficits were identified   Sensory Examination   Sensory Quick Adds No deficits were identified   Pain Assessment   Patient Currently in Pain No   Posture   Posture not impaired   Range of Motion (ROM)   ROM Quick Adds No deficits were identified   Strength   Manual Muscle Testing Quick Adds No deficits were identified   Hand Strength   Hand Strength Comments intact   Muscle Tone Assessment   Muscle Tone Quick Adds No deficits were identified   Coordination   Upper Extremity Coordination No deficits were identified   Transfer Skill: Bed to Chair/Chair to Bed   Level of [place]: Bed to Chair independent   Transfer Skill: Sit to Stand   Level of [place]: Sit/Stand independent   Balance   Balance Comments LOB was not noted   Upper Body Dressing   Level of [place]: Dress Upper Body independent   Lower Body Dressing   Level of [place]: Dress Lower Body independent   Grooming   Level of [place]: Grooming independent   Eating/Self Feeding   Level of [place]: Eating independent   Instrumental Activities of Daily Living (IADL)   IADL Comments spouse and family to complete as needed   Activities of Daily Living Analysis   Impairments Contributing to Impaired Activities of Daily Living post surgical precautions   General Therapy Interventions   Planned Therapy Interventions home program guidelines;progressive activity/exercise;risk factor education   Clinical Impression   Criteria for Skilled Therapeutic Interventions Met yes, treatment indicated   OT Diagnosis cardiac surgical precautions   Influenced by the following impairments post surgical precautions   Assessment of Occupational  "Performance 1-3 Performance Deficits   Clinical Decision Making (Complexity) Low complexity   Therapy Frequency daily   Predicted Duration of Therapy Intervention (days/wks) 1 time session only   Anticipated Discharge Disposition Home with Outpatient Therapy   Risks and Benefits of Treatment have been explained. Yes   Patient, Family & other staff in agreement with plan of care Yes   [facility] TM \"6 Clicks\"   2016, Trustees of [facility], under license to [employer].  All rights reserved.   6 Clicks Short Forms Daily Activity Inpatient Short Form   [facility]  \"6 Clicks\" Daily Activity Inpatient Short Form   1. Putting on and taking off regular lower body clothing? 4 - None   2. Bathing (including washing, rinsing, drying)? 4 - None   3. Toileting, which includes using toilet, bedpan or urinal? 4 - None   4. Putting on and taking off regular upper body clothing? 4 - None   5. Taking care of personal grooming such as brushing teeth? 4 - None   6. Eating meals? 4 - None   Daily Activity Raw Score (Score out of 24.Lower scores equate to lower levels of function) 24   Total Evaluation Time   Total Evaluation Time (Minutes) 10     "

## 2018-06-13 ENCOUNTER — CARE COORDINATION (OUTPATIENT)
Dept: CARDIOLOGY | Facility: CLINIC | Age: 43
End: 2018-06-13

## 2018-06-13 LAB
INTERPRETATION ECG - MUSE: NORMAL

## 2018-06-13 NOTE — PROGRESS NOTES
Patient was evaluated by cardiology while inpatient for STEMI and underwent coronary angiogram resulting in KATLYN to prox LAD. Called patient and left VM to discuss any post hospital d/c questions he may have, review medication changes, and confirm f/u appts. RN advised patient in  to call clinic asap if he did not receive his rx for Plavix. RN advised patient in  that he has an apt scheduled on 6/18/18 for OP cardiac rehab and on 6/21/18 with CHANTALE Tye Bran (730am lab and 830am with CHANTALE). Patient advised in  to call clinic with any cardiac related questions or concerns prior to this scheduled chantale't.

## 2018-06-18 ENCOUNTER — HOSPITAL ENCOUNTER (OUTPATIENT)
Dept: CARDIAC REHAB | Facility: CLINIC | Age: 43
End: 2018-06-18
Attending: PHYSICIAN ASSISTANT
Payer: COMMERCIAL

## 2018-06-18 PROCEDURE — 40000575 ZZH STATISTIC OP CARDIAC VISIT #2

## 2018-06-18 PROCEDURE — 40000116 ZZH STATISTIC OP CR VISIT

## 2018-06-18 PROCEDURE — 93798 PHYS/QHP OP CAR RHAB W/ECG: CPT

## 2018-06-18 PROCEDURE — 93797 PHYS/QHP OP CAR RHAB WO ECG: CPT

## 2018-06-26 ENCOUNTER — OFFICE VISIT (OUTPATIENT)
Dept: CARDIOLOGY | Facility: CLINIC | Age: 43
End: 2018-06-26
Attending: PHYSICIAN ASSISTANT
Payer: COMMERCIAL

## 2018-06-26 VITALS
WEIGHT: 185 LBS | BODY MASS INDEX: 25.06 KG/M2 | DIASTOLIC BLOOD PRESSURE: 78 MMHG | HEIGHT: 72 IN | HEART RATE: 80 BPM | SYSTOLIC BLOOD PRESSURE: 112 MMHG | OXYGEN SATURATION: 96 %

## 2018-06-26 DIAGNOSIS — I21.4 NSTEMI (NON-ST ELEVATED MYOCARDIAL INFARCTION) (H): ICD-10-CM

## 2018-06-26 DIAGNOSIS — I21.02 ACUTE ST ELEVATION MYOCARDIAL INFARCTION (STEMI) INVOLVING LEFT ANTERIOR DESCENDING (LAD) CORONARY ARTERY (H): ICD-10-CM

## 2018-06-26 LAB
ANION GAP SERPL CALCULATED.3IONS-SCNC: 5 MMOL/L (ref 3–14)
BUN SERPL-MCNC: 13 MG/DL (ref 7–30)
CALCIUM SERPL-MCNC: 9.2 MG/DL (ref 8.5–10.1)
CHLORIDE SERPL-SCNC: 105 MMOL/L (ref 94–109)
CO2 SERPL-SCNC: 29 MMOL/L (ref 20–32)
CREAT SERPL-MCNC: 0.87 MG/DL (ref 0.66–1.25)
GFR SERPL CREATININE-BSD FRML MDRD: >90 ML/MIN/1.7M2
GLUCOSE SERPL-MCNC: 85 MG/DL (ref 70–99)
POTASSIUM SERPL-SCNC: 4 MMOL/L (ref 3.4–5.3)
SODIUM SERPL-SCNC: 139 MMOL/L (ref 133–144)

## 2018-06-26 PROCEDURE — 36415 COLL VENOUS BLD VENIPUNCTURE: CPT | Performed by: PHYSICIAN ASSISTANT

## 2018-06-26 PROCEDURE — 99214 OFFICE O/P EST MOD 30 MIN: CPT | Performed by: PHYSICIAN ASSISTANT

## 2018-06-26 PROCEDURE — 80048 BASIC METABOLIC PNL TOTAL CA: CPT | Performed by: PHYSICIAN ASSISTANT

## 2018-06-26 RX ORDER — CLOPIDOGREL BISULFATE 75 MG/1
75 TABLET ORAL DAILY
Qty: 90 TABLET | Refills: 3 | Status: SHIPPED | OUTPATIENT
Start: 2018-06-26 | End: 2019-07-31

## 2018-06-26 RX ORDER — ATORVASTATIN CALCIUM 40 MG/1
40 TABLET, FILM COATED ORAL AT BEDTIME
Qty: 30 TABLET | Refills: 11 | Status: SHIPPED | OUTPATIENT
Start: 2018-06-26 | End: 2019-05-22

## 2018-06-26 RX ORDER — ISOSORBIDE MONONITRATE 30 MG/1
30 TABLET, EXTENDED RELEASE ORAL DAILY
Qty: 30 TABLET | Refills: 11 | Status: SHIPPED | OUTPATIENT
Start: 2018-06-26 | End: 2019-04-25

## 2018-06-26 NOTE — PROGRESS NOTES
CARDIOLOGY CLINIC PROGRESS NOTE    DOS: 2018    Jeremy Lainez  : 1975, 42 year old  MRN: [id number]      History:   I had the pleasure of meeting Jeremy Lainez today in follow up after a recent hospital admission.     Jeremy Lainez is a very pleasant 42 year old male with history of tobacco dependence, who was admitted on 2018 w/ about 1 week history of intermittent substernal chest pain associated with diaphoresis.  He was sent from PMD office to [facility] ER due to abnormal ECG concerning for anterior T inversions.  Mild troponin elevation.  He was hypertensive. Cardiac catheterization 18 with 60% OM1 lesion and 40-50% p-mLAD lesion, negative FFR (0.84) across the latter.  He had recurrent chest discomfort over the weekend. Echo 6/10/18 without definitive WMAs.  Heparin resumed.   Repeat cath 18: 60% prox LAD with evidence of plaque rupture.  Acetylcholine provocation study with spasm in the prox LAD lesion without significant spasm elsewhere. Given the results and recurrence while on DAPT and medical therapy, he underwent 3.5 x 28 mm synergy KATLYN to prox LAD.   He had some bradycardia with addition of beta-blocker, so Imdur started.     Interval History:   He presents today for follow up.   He is doing well. No recurrence of symptoms.   He has completely stopped smoking. No urge.   He does use marijuana occasionally. We discussed trying to find a healthier outlet for stress.   His forearm has ecchymosis, but no hematoma, no pain.   No bleeding on DAPT.   No myalgias.   He has occasional mild headaches. We discussed using acetaminophen as needed.   He is looking for a new PCP.   He had his first session at cardiac rehab. He goes tomorrow. After cardiac rehab is complete, he will try to start an exercise program.       ROS:  Skin:  Positive for bruising   Eyes:       ENT:  Negative for    Respiratory:  Negative for    Cardiovascular:  Negative for    Gastroenterology: Negative for     Genitourinary:  not assessed    Musculoskeletal:  Negative for    Neurologic:  Positive for headaches  Psychiatric:  Negative for    Heme/Lymph/Imm:  Negative for    Endocrine:  Negative      PAST MEDICAL HISTORY:  No past medical history on file.    PAST SURGICAL HISTORY:  No past surgical history on file.    SOCIAL HISTORY:  Social History     Social History     Marital status:      Spouse name: N/A     Number of children: N/A     Years of education: N/A     Social History Main Topics     Smoking status: Former Smoker     Quit date: 6/8/2018     Smokeless tobacco: Never Used     Alcohol use Yes      Comment: 1-2 drinks a night      Drug use: None     Sexual activity: Not Asked     Other Topics Concern     None     Social History Narrative     None       FAMILY HISTORY:  History reviewed. No pertinent family history.    MEDS:   Current Outpatient Prescriptions on File Prior to Visit:  aspirin 81 MG EC tablet Take 1 tablet (81 mg) by mouth daily   [DISCONTINUED] atorvastatin (LIPITOR) 40 MG tablet Take 1 tablet (40 mg) by mouth At Bedtime   [DISCONTINUED] clopidogrel (PLAVIX) 75 MG tablet Take 1 tablet (75 mg) by mouth daily   [DISCONTINUED] isosorbide mononitrate (IMDUR) 30 MG 24 hr tablet Take 1 tablet (30 mg) by mouth daily     No current facility-administered medications on file prior to visit.     ALLERGIES: No Known Allergies    PHYSICAL EXAM:  Vitals: /78 (BP Location: Right arm)  Pulse 80  Ht 1.829 m (6')  Wt 83.9 kg (185 lb)  SpO2 96%  BMI 25.09 kg/m2  Constitutional:  cooperative, alert and oriented, well developed, well nourished, in no acute distress        Skin:  warm and dry to the touch        Head:  normocephalic, no masses or lesions        Eyes:  pupils equal and round;conjunctivae and lids unremarkable;sclera white;no xanthalasma        ENT:  no pallor or cyanosis, dentition good        Neck:  JVP normal        Respiratory:  normal breath sounds, clear to auscultation, normal  A-P diameter, normal symmetry, normal respiratory excursion, no use of accessory muscles        Cardiac: regular rhythm, normal S1/S2, no S3 or S4, apical impulse not displaced, no murmurs, gallops or rubs                  GI:  abdomen soft;BS normoactive;non-tender        Vascular:       right radial artery;2+             left radial artery;2+             right radial site: reverse jose normal, moderate ecchymosis that is healing without hematoma    Extremities and Musculoskeletal:  no deformities, clubbing, cyanosis, erythema observed;no edema;no spinal abnormalities noted;normal muscle strength and tone        Neurological:  no gross motor deficits;affect appropriate          LABS/DATA:  I reviewed the following:  Component      Latest Ref Rng & Units 6/26/2018   Sodium      133 - 144 mmol/L 139   Potassium      3.4 - 5.3 mmol/L 4.0   Chloride      94 - 109 mmol/L 105   Carbon Dioxide      20 - 32 mmol/L 29   Anion Gap      3 - 14 mmol/L 5   Glucose      70 - 99 mg/dL 85   Urea Nitrogen      7 - 30 mg/dL 13   Creatinine      0.66 - 1.25 mg/dL 0.87   GFR Estimate      >60 mL/min/1.7m2 >90   GFR Estimate If Black      >60 mL/min/1.7m2 >90   Calcium      8.5 - 10.1 mg/dL 9.2         ASSESSMENT/PLAN:  STEMI  Vasospasm  -troponin peak 0.1  -anterior ST elevation  -6/8/18 urgent cath: 40-50% pLAD and 60% OM1(small vessel)  LAD not found to be flow limiting per FFR (0.84).  No PCI  -developed recurrent CP & palpitations w/ associated ST elevations in V2-V5 w/ T-wave inversions  -ECHO 6/10/18 LVEF 55% possible Mild mid/high lateral, anterior RWMA. Though no definitive WMAs  -Repeat cath 6/11/18: 60% prox LAD. Acetylcholine provocation study with spasm in the prox LAD lesion without significant spasm elsewhere. Given the results and recurrent SINDI while on DAPT and medical therapy he underwent 3.5 x 28 mm synergy KATLYN to prox LAD.   -ASA, Plavix, statin. He was bradycardic on Toprol XL 12.5 mg, so on Imdur 30 mg for BP and  spasm.  Occasional mild, dull headache.  We reviewed using Tylenol as needed.  If bothersome, he will call and we could use Norvasc  -Cardiac rehab  -Quit smoking!   -Mediterranean style diet     HTN  -elevated on admit  -Controlled on Imdur 30 mg for BP and spasm. Could use Norvasc if needed in the future      Tobacco dependence  -quit at time of admit  -motivated to stay off cigarettes  -congratulated him on this      Statin started  -Baseline FLP 6/9/18: , HDL 46, LDL 71,   -Repeat FLP 6/11/18: , HDL 51, LDL 79, TG 52  -Started atorvastatin 40 mg  -will need lipid panel in 2 months      Follow up:  Looking for PCP  Dr. Lopez in 2 months with FLP/ALT    Tye Bran PA-C

## 2018-06-26 NOTE — MR AVS SNAPSHOT
After Visit Summary   6/26/2018    Jeremy Lainez    MRN: [id number]           Patient Information     Date Of Birth          1975        Visit Information        Provider Department      6/26/2018 8:30 AM Tye Bran PA-C [facility]        Today's Diagnoses     NSTEMI (non-ST elevated myocardial infarction) (H)        Acute ST elevation myocardial infarction (STEMI) involving left anterior descending (LAD) coronary artery (H)          Care Instructions    You are doing well.     You can use acetaminophen as needed for the occasional headaches. If they become bothersome, please let us know.  Avoid Advil and Aleve if possible - you can use very intermittently.     Your arm will continue to improve over the next couple weeks.     Start cardiac rehab.  You can then start to increase activity.     If any questions or concerns, please call us.     I refilled prescriptions.           Follow-ups after your visit        Your next 10 appointments already scheduled     Jun 27, 2018  8:00 AM CDT   Cardiac Treatment with  Cardiac Rehab 1   [facility] ([facility])    [address]  [facility] [address]   [phone]            Jun 28, 2018  6:30 AM CDT   Cardiac Treatment with  Cardiac Rehab 1   [facility] ([facility]    [address]  [facility] [address]   [phone]            Jul 02, 2018  6:30 AM CDT   Cardiac Treatment with  Cardiac Rehab 1   [facility] ([facility])    [address]  [facility] [address]   [phone]            Jul 09, 2018  6:30 AM CDT   Cardiac Treatment with Sh Cardiac Rehab 1   [facility] ([facility])    [address]  [facility] [address]   [phone]            Jul 11, 2018  8:00 AM CDT   Cardiac  Treatment with Sh Cardiac Rehab 1   [facility] ([facility])    [address]  [place] [address]   [phone]            Jul 12, 2018  6:30 AM CDT   Cardiac Treatment with Sh Cardiac Rehab 1   [facility] ([facility])    [address]  [place] [address]   [phone]            Jul 16, 2018  6:30 AM CDT   Cardiac Treatment with Sh Cardiac Rehab 1   [facility] ([facility])    [address]  [place] [address]   [phone]            Jul 18, 2018  8:00 AM CDT   Cardiac Treatment with Sh Cardiac Rehab 1   [facility] ([facility])    [address]  [place] [address]   [phone]            Jul 19, 2018  6:30 AM CDT   Cardiac Treatment with Sh Cardiac Rehab 1   [facility] ([facility])    [address]  [place] [address]   [phone]            Jul 19, 2018  7:30 AM CDT   CONSULT with Sh Cardiac Rehab 1   [facility] ([facility])    [address]  [place] [address]   [phone]              Who to contact     If you have questions or need follow up information about today's clinic visit or your schedule please contact [facility] directly at [phone].  Normal or non-critical lab and imaging results will be communicated to you by MyChart, letter or phone within 4 business days after the clinic has received the results. If you do not hear from us within 7 days, please contact the clinic through MyChart or phone. If you have a critical or abnormal lab result, we will notify you by phone as soon as possible.  Submit refill requests through [employer] or call your pharmacy and they will forward the refill request to us. Please allow 3  business days for your refill to be completed.          Additional Information About Your Visit        MyChart Information     [employer] gives you secure access to your electronic health record. If you see a primary care provider, you can also send messages to your care team and make appointments. If you have questions, please call your primary care clinic.  If you do not have a primary care provider, please call [phone] and they will assist you.        Care EveryWhere ID     This is your Care EveryWhere ID. This could be used by other organizations to access your [facility] medical records  [id number]        Your Vitals Were     Pulse Height Pulse Oximetry BMI (Body Mass Index)          80 1.829 m (6') 96% 25.09 kg/m2         Blood Pressure from Last 3 Encounters:   06/26/18 112/78   06/12/18 102/62    Weight from Last 3 Encounters:   06/26/18 83.9 kg (185 lb)   06/08/18 82.7 kg (182 lb 4.8 oz)              We Performed the Following     Follow-Up with Cardiac Advanced Practice Provider          Where to get your medicines      These medications were sent to [place] [address] [place] [address]  [address] [facility] [address]     Phone:  [phone]     atorvastatin 40 MG tablet    clopidogrel 75 MG tablet    isosorbide mononitrate 30 MG 24 hr tablet          Primary Care Provider Fax #    Physician No Ref-Primary [phone]       No address on file        Equal Access to Services     CORNELIO GIBSON : Hadii sd strattono Soibrahima, waaxda luqadaha, qaybta kaalmada gillian, maggie lam . So [facility] [phone].    ATENCIÓN: Si habla español, tiene a garza disposición servicios gratuitos de asistencia lingüística. Llame al [phone].    We comply with applicable federal civil rights laws and [place] laws. We do not discriminate on the basis of race, color, national origin, age, disability, sex, sexual orientation, or gender identity.            Thank  you!     Thank you for choosing [facility]  for your care. Our goal is always to provide you with excellent care. Hearing back from our patients is one way we can continue to improve our services. Please take a few minutes to complete the written survey that you may receive in the mail after your visit with us. Thank you!             Your Updated Medication List - Protect others around you: Learn how to safely use, store and throw away your medicines at www.disposemymeds.org.          This list is accurate as of 6/26/18  9:20 AM.  Always use your most recent med list.                   Brand Name Dispense Instructions for use Diagnosis    aspirin 81 MG EC tablet      Take 1 tablet (81 mg) by mouth daily    Acute ST elevation myocardial infarction (STEMI) involving left anterior descending (LAD) coronary artery (H)       atorvastatin 40 MG tablet    LIPITOR    30 tablet    Take 1 tablet (40 mg) by mouth At Bedtime    Acute ST elevation myocardial infarction (STEMI) involving left anterior descending (LAD) coronary artery (H)       clopidogrel 75 MG tablet    PLAVIX    90 tablet    Take 1 tablet (75 mg) by mouth daily    Acute ST elevation myocardial infarction (STEMI) involving left anterior descending (LAD) coronary artery (H)       isosorbide mononitrate 30 MG 24 hr tablet    IMDUR    30 tablet    Take 1 tablet (30 mg) by mouth daily    Acute ST elevation myocardial infarction (STEMI) involving left anterior descending (LAD) coronary artery (H)

## 2018-06-26 NOTE — PATIENT INSTRUCTIONS
You are doing well.     You can use acetaminophen as needed for the occasional headaches. If they become bothersome, please let us know.  Avoid Advil and Aleve if possible - you can use very intermittently.     Your arm will continue to improve over the next couple weeks.     Start cardiac rehab.  You can then start to increase activity.     If any questions or concerns, please call us.     I refilled prescriptions.

## 2018-06-26 NOTE — LETTER
2018    Physician No Ref-Primary  No address on file    RE: Jeremy Lainez       Dear Colleague,    I had the pleasure of seeing Jeremy Lainez in the [facility].    CARDIOLOGY CLINIC PROGRESS NOTE    DOS: 2018    Jeremy Lainez  : 1975, 42 year old  MRN: [id number]      History:   I had the pleasure of meeting Jeremy Lainez today in follow up after a recent hospital admission.     Jeremy Lainez is a very pleasant 42 year old male with history of tobacco dependence, who was admitted on 2018 w/ about 1 week history of intermittent substernal chest pain associated with diaphoresis.  He was sent from PMD office to [facility] ER due to abnormal ECG concerning for anterior T inversions.  Mild troponin elevation.  He was hypertensive. Cardiac catheterization 18 with 60% OM1 lesion and 40-50% p-mLAD lesion, negative FFR (0.84) across the latter.  He had recurrent chest discomfort over the weekend. Echo 6/10/18 without definitive WMAs.  Heparin resumed.   Repeat cath 18: 60% prox LAD with evidence of plaque rupture.  Acetylcholine provocation study with spasm in the prox LAD lesion without significant spasm elsewhere. Given the results and recurrence while on DAPT and medical therapy, he underwent 3.5 x 28 mm synergy KATLYN to prox LAD.   He had some bradycardia with addition of beta-blocker, so Imdur started.     Interval History:   He presents today for follow up.   He is doing well. No recurrence of symptoms.   He has completely stopped smoking. No urge.   He does use marijuana occasionally. We discussed trying to find a healthier outlet for stress.   His forearm has ecchymosis, but no hematoma, no pain.   No bleeding on DAPT.   No myalgias.   He has occasional mild headaches. We discussed using acetaminophen as needed.   He is looking for a new PCP.   He had his first session at cardiac rehab. He goes tomorrow. After cardiac rehab is complete, he will try to  start an exercise program.       ROS:  Skin:  Positive for bruising   Eyes:       ENT:  Negative for    Respiratory:  Negative for    Cardiovascular:  Negative for    Gastroenterology: Negative for    Genitourinary:  not assessed    Musculoskeletal:  Negative for    Neurologic:  Positive for headaches  Psychiatric:  Negative for    Heme/Lymph/Imm:  Negative for    Endocrine:  Negative      PAST MEDICAL HISTORY:  No past medical history on file.    PAST SURGICAL HISTORY:  No past surgical history on file.    SOCIAL HISTORY:  Social History     Social History     Marital status:      Spouse name: N/A     Number of children: N/A     Years of education: N/A     Social History Main Topics     Smoking status: Former Smoker     Quit date: 6/8/2018     Smokeless tobacco: Never Used     Alcohol use Yes      Comment: 1-2 drinks a night      Drug use: None     Sexual activity: Not Asked     Other Topics Concern     None     Social History Narrative     None       FAMILY HISTORY:  History reviewed. No pertinent family history.    MEDS:   Current Outpatient Prescriptions on File Prior to Visit:  aspirin 81 MG EC tablet Take 1 tablet (81 mg) by mouth daily   [DISCONTINUED] atorvastatin (LIPITOR) 40 MG tablet Take 1 tablet (40 mg) by mouth At Bedtime   [DISCONTINUED] clopidogrel (PLAVIX) 75 MG tablet Take 1 tablet (75 mg) by mouth daily   [DISCONTINUED] isosorbide mononitrate (IMDUR) 30 MG 24 hr tablet Take 1 tablet (30 mg) by mouth daily     No current facility-administered medications on file prior to visit.     ALLERGIES: No Known Allergies    PHYSICAL EXAM:  Vitals: /78 (BP Location: Right arm)  Pulse 80  Ht 1.829 m (6')  Wt 83.9 kg (185 lb)  SpO2 96%  BMI 25.09 kg/m2  Constitutional:  cooperative, alert and oriented, well developed, well nourished, in no acute distress        Skin:  warm and dry to the touch        Head:  normocephalic, no masses or lesions        Eyes:  pupils equal and round;conjunctivae  and lids unremarkable;sclera white;no xanthalasma        ENT:  no pallor or cyanosis, dentition good        Neck:  JVP normal        Respiratory:  normal breath sounds, clear to auscultation, normal A-P diameter, normal symmetry, normal respiratory excursion, no use of accessory muscles        Cardiac: regular rhythm, normal S1/S2, no S3 or S4, apical impulse not displaced, no murmurs, gallops or rubs                  GI:  abdomen soft;BS normoactive;non-tender        Vascular:       right radial artery;2+             left radial artery;2+             right radial site: reverse jose normal, moderate ecchymosis that is healing without hematoma    Extremities and Musculoskeletal:  no deformities, clubbing, cyanosis, erythema observed;no edema;no spinal abnormalities noted;normal muscle strength and tone        Neurological:  no gross motor deficits;affect appropriate          LABS/DATA:  I reviewed the following:  Component      Latest Ref Rng & Units 6/26/2018   Sodium      133 - 144 mmol/L 139   Potassium      3.4 - 5.3 mmol/L 4.0   Chloride      94 - 109 mmol/L 105   Carbon Dioxide      20 - 32 mmol/L 29   Anion Gap      3 - 14 mmol/L 5   Glucose      70 - 99 mg/dL 85   Urea Nitrogen      7 - 30 mg/dL 13   Creatinine      0.66 - 1.25 mg/dL 0.87   GFR Estimate      >60 mL/min/1.7m2 >90   GFR Estimate If Black      >60 mL/min/1.7m2 >90   Calcium      8.5 - 10.1 mg/dL 9.2         ASSESSMENT/PLAN:  STEMI  Vasospasm  -troponin peak 0.1  -anterior ST elevation  -6/8/18 urgent cath: 40-50% pLAD and 60% OM1(small vessel)  LAD not found to be flow limiting per FFR (0.84).  No PCI  -developed recurrent CP & palpitations w/ associated ST elevations in V2-V5 w/ T-wave inversions  -ECHO 6/10/18 LVEF 55% possible Mild mid/high lateral, anterior RWMA. Though no definitive WMAs  -Repeat cath 6/11/18: 60% prox LAD. Acetylcholine provocation study with spasm in the prox LAD lesion without significant spasm elsewhere. Given the  results and recurrent SINDI while on DAPT and medical therapy he underwent 3.5 x 28 mm synergy KATLYN to prox LAD.   -ASA, Plavix, statin. He was bradycardic on Toprol XL 12.5 mg, so on Imdur 30 mg for BP and spasm.  Occasional mild, dull headache.  We reviewed using Tylenol as needed.  If bothersome, he will call and we could use Norvasc  -Cardiac rehab  -Quit smoking!   -Mediterranean style diet     HTN  -elevated on admit  -Controlled on Imdur 30 mg for BP and spasm. Could use Norvasc if needed in the future      Tobacco dependence  -quit at time of admit  -motivated to stay off cigarettes  -congratulated him on this      Statin started  -Baseline FLP 6/9/18: , HDL 46, LDL 71,   -Repeat FLP 6/11/18: , HDL 51, LDL 79, TG 52  -Started atorvastatin 40 mg  -will need lipid panel in 2 months      Follow up:  Looking for PCP  Dr. Lopez in 2 months with FLP/ALT    Tye Bran PA-C    Thank you for allowing me to participate in the care of your patient.      Sincerely,     Tye Bran PA-C     [facility]    cc:   Tye Bran PA-C  [address] [place] [address]

## 2018-06-28 ENCOUNTER — HOSPITAL ENCOUNTER (OUTPATIENT)
Dept: CARDIAC REHAB | Facility: CLINIC | Age: 43
End: 2018-06-28
Attending: PHYSICIAN ASSISTANT
Payer: COMMERCIAL

## 2018-06-28 PROCEDURE — 40000116 ZZH STATISTIC OP CR VISIT: Performed by: OCCUPATIONAL THERAPIST

## 2018-06-28 PROCEDURE — 93798 PHYS/QHP OP CAR RHAB W/ECG: CPT | Performed by: OCCUPATIONAL THERAPIST

## 2018-07-02 ENCOUNTER — HOSPITAL ENCOUNTER (OUTPATIENT)
Dept: CARDIAC REHAB | Facility: CLINIC | Age: 43
End: 2018-07-02
Attending: PHYSICIAN ASSISTANT
Payer: COMMERCIAL

## 2018-07-02 PROCEDURE — 40000116 ZZH STATISTIC OP CR VISIT

## 2018-07-02 PROCEDURE — 93798 PHYS/QHP OP CAR RHAB W/ECG: CPT

## 2018-07-09 ENCOUNTER — HOSPITAL ENCOUNTER (OUTPATIENT)
Dept: CARDIAC REHAB | Facility: CLINIC | Age: 43
End: 2018-07-09
Attending: PHYSICIAN ASSISTANT
Payer: COMMERCIAL

## 2018-07-09 PROCEDURE — 93798 PHYS/QHP OP CAR RHAB W/ECG: CPT

## 2018-07-09 PROCEDURE — 40000116 ZZH STATISTIC OP CR VISIT

## 2018-07-11 ENCOUNTER — HOSPITAL ENCOUNTER (OUTPATIENT)
Dept: CARDIAC REHAB | Facility: CLINIC | Age: 43
End: 2018-07-11
Attending: PHYSICIAN ASSISTANT
Payer: COMMERCIAL

## 2018-07-11 PROCEDURE — 40000116 ZZH STATISTIC OP CR VISIT: Performed by: REHABILITATION PRACTITIONER

## 2018-07-11 PROCEDURE — 93798 PHYS/QHP OP CAR RHAB W/ECG: CPT | Performed by: REHABILITATION PRACTITIONER

## 2018-07-12 ENCOUNTER — HOSPITAL ENCOUNTER (OUTPATIENT)
Dept: CARDIAC REHAB | Facility: CLINIC | Age: 43
End: 2018-07-12
Attending: PHYSICIAN ASSISTANT
Payer: COMMERCIAL

## 2018-07-12 PROCEDURE — 93798 PHYS/QHP OP CAR RHAB W/ECG: CPT | Performed by: CLINICAL EXERCISE PHYSIOLOGIST

## 2018-07-12 PROCEDURE — 40000116 ZZH STATISTIC OP CR VISIT: Performed by: CLINICAL EXERCISE PHYSIOLOGIST

## 2018-07-16 ENCOUNTER — HOSPITAL ENCOUNTER (OUTPATIENT)
Dept: CARDIAC REHAB | Facility: CLINIC | Age: 43
End: 2018-07-16
Attending: PHYSICIAN ASSISTANT
Payer: COMMERCIAL

## 2018-07-16 PROCEDURE — 40000116 ZZH STATISTIC OP CR VISIT

## 2018-07-16 PROCEDURE — 93798 PHYS/QHP OP CAR RHAB W/ECG: CPT

## 2018-07-19 ENCOUNTER — HOSPITAL ENCOUNTER (OUTPATIENT)
Dept: CARDIAC REHAB | Facility: CLINIC | Age: 43
End: 2018-07-19
Attending: PHYSICIAN ASSISTANT
Payer: COMMERCIAL

## 2018-07-19 PROCEDURE — 93798 PHYS/QHP OP CAR RHAB W/ECG: CPT | Performed by: CLINICAL EXERCISE PHYSIOLOGIST

## 2018-07-19 PROCEDURE — 40000116 ZZH STATISTIC OP CR VISIT: Performed by: CLINICAL EXERCISE PHYSIOLOGIST

## 2018-07-23 ENCOUNTER — HOSPITAL ENCOUNTER (OUTPATIENT)
Dept: CARDIAC REHAB | Facility: CLINIC | Age: 43
End: 2018-07-23
Attending: PHYSICIAN ASSISTANT
Payer: COMMERCIAL

## 2018-07-23 PROCEDURE — 40000116 ZZH STATISTIC OP CR VISIT

## 2018-07-23 PROCEDURE — 93798 PHYS/QHP OP CAR RHAB W/ECG: CPT

## 2018-07-26 ENCOUNTER — HOSPITAL ENCOUNTER (OUTPATIENT)
Dept: CARDIAC REHAB | Facility: CLINIC | Age: 43
End: 2018-07-26
Attending: PHYSICIAN ASSISTANT
Payer: COMMERCIAL

## 2018-07-26 PROCEDURE — 40000116 ZZH STATISTIC OP CR VISIT: Performed by: OCCUPATIONAL THERAPIST

## 2018-07-26 PROCEDURE — 93798 PHYS/QHP OP CAR RHAB W/ECG: CPT | Performed by: OCCUPATIONAL THERAPIST

## 2018-07-30 ENCOUNTER — HOSPITAL ENCOUNTER (OUTPATIENT)
Dept: CARDIAC REHAB | Facility: CLINIC | Age: 43
End: 2018-07-30
Attending: PHYSICIAN ASSISTANT
Payer: COMMERCIAL

## 2018-07-30 PROCEDURE — 40000116 ZZH STATISTIC OP CR VISIT

## 2018-07-30 PROCEDURE — 93798 PHYS/QHP OP CAR RHAB W/ECG: CPT

## 2018-08-06 ENCOUNTER — HOSPITAL ENCOUNTER (OUTPATIENT)
Dept: CARDIAC REHAB | Facility: CLINIC | Age: 43
End: 2018-08-06
Attending: PHYSICIAN ASSISTANT
Payer: COMMERCIAL

## 2018-08-06 PROCEDURE — 93798 PHYS/QHP OP CAR RHAB W/ECG: CPT

## 2018-08-06 PROCEDURE — 40000116 ZZH STATISTIC OP CR VISIT

## 2018-08-08 ENCOUNTER — HOSPITAL ENCOUNTER (OUTPATIENT)
Dept: CARDIAC REHAB | Facility: CLINIC | Age: 43
End: 2018-08-08
Attending: PHYSICIAN ASSISTANT
Payer: COMMERCIAL

## 2018-08-08 PROCEDURE — 40000116 ZZH STATISTIC OP CR VISIT: Performed by: OCCUPATIONAL THERAPIST

## 2018-08-08 PROCEDURE — 93798 PHYS/QHP OP CAR RHAB W/ECG: CPT | Performed by: OCCUPATIONAL THERAPIST

## 2018-08-09 ENCOUNTER — HOSPITAL ENCOUNTER (OUTPATIENT)
Dept: CARDIAC REHAB | Facility: CLINIC | Age: 43
End: 2018-08-09
Attending: PHYSICIAN ASSISTANT
Payer: COMMERCIAL

## 2018-08-09 PROCEDURE — 93798 PHYS/QHP OP CAR RHAB W/ECG: CPT | Performed by: CLINICAL EXERCISE PHYSIOLOGIST

## 2018-08-09 PROCEDURE — 40000116 ZZH STATISTIC OP CR VISIT: Performed by: CLINICAL EXERCISE PHYSIOLOGIST

## 2018-08-10 ENCOUNTER — OFFICE VISIT (OUTPATIENT)
Dept: CARDIOLOGY | Facility: CLINIC | Age: 43
End: 2018-08-10
Attending: PHYSICIAN ASSISTANT
Payer: COMMERCIAL

## 2018-08-10 VITALS
WEIGHT: 188.8 LBS | SYSTOLIC BLOOD PRESSURE: 122 MMHG | HEIGHT: 72 IN | HEART RATE: 76 BPM | BODY MASS INDEX: 25.57 KG/M2 | DIASTOLIC BLOOD PRESSURE: 78 MMHG

## 2018-08-10 DIAGNOSIS — I21.4 NSTEMI (NON-ST ELEVATED MYOCARDIAL INFARCTION) (H): ICD-10-CM

## 2018-08-10 LAB
ALT SERPL W P-5'-P-CCNC: 32 U/L (ref 0–70)
CHOLEST SERPL-MCNC: 95 MG/DL
HDLC SERPL-MCNC: 53 MG/DL
LDLC SERPL CALC-MCNC: 30 MG/DL
NONHDLC SERPL-MCNC: 42 MG/DL
TRIGL SERPL-MCNC: 61 MG/DL

## 2018-08-10 PROCEDURE — 84460 ALANINE AMINO (ALT) (SGPT): CPT | Performed by: PHYSICIAN ASSISTANT

## 2018-08-10 PROCEDURE — 99214 OFFICE O/P EST MOD 30 MIN: CPT | Performed by: INTERNAL MEDICINE

## 2018-08-10 PROCEDURE — 80061 LIPID PANEL: CPT | Performed by: PHYSICIAN ASSISTANT

## 2018-08-10 PROCEDURE — 36415 COLL VENOUS BLD VENIPUNCTURE: CPT | Performed by: PHYSICIAN ASSISTANT

## 2018-08-10 RX ORDER — NITROGLYCERIN 0.4 MG/1
TABLET SUBLINGUAL
Qty: 25 TABLET | Refills: 3 | Status: SHIPPED | OUTPATIENT
Start: 2018-08-10 | End: 2020-11-18

## 2018-08-10 NOTE — LETTER
8/10/2018    Physician No Ref-Primary  No address on file    RE: Jeremy Lainez       Dear Colleague,    I had the pleasure of seeing Jeremy Lainez in the [facility].    Cardiology Progress Note          Assessment and Plan:     1. Coronary artery disease status post PCI of the LAD 6/11/2018 for atherosclerotic coronary disease with component of spasm    Gave prescription for sublingual nitroglycerin if needed.    Continue current cardiovascular medications.  LDL looks very good today on 40 milligrams of atorvastatin.    Follow-up in 6 months with Tye, myself in 1 year as he has not yet established care with a primary physician.      This note was transcribed using electronic voice recognition software and there may be typographical errors present.                Interval History:   The patient is a very pleasant 42 year old previous smoker with spasm and KATLYN to the LAD 6/11/2018.  He has been able to quit smoking.  He still uses recreational marijuana.  He has not had any recurrent chest discomfort similar to his previous angina.  He gets fleeting intermittent chest discomfort that may be consistent with PACs or PVCs.  He has consistent caffeine intake, but no escalation.  He feels very well overall.  He is participating in cardiac rehab.                     Review of Systems:   Review of Systems:  Skin:  Positive for bruising   Eyes:  Negative    ENT:  Positive for sinus trouble  Respiratory:  Negative    Cardiovascular:    Positive for;palpitations  Gastroenterology: Negative    Genitourinary:  Negative    Musculoskeletal:  Negative    Neurologic:  Negative    Psychiatric:  Negative    Heme/Lymph/Imm:  Negative    Endocrine:  Negative                Physical Exam:     Vitals: /78 (BP Location: Right arm, Patient Position: Chair, Cuff Size: Adult Regular)  Pulse 76  Ht 1.829 m (6')  Wt 85.6 kg (188 lb 12.8 oz)  BMI 25.61 kg/m2  Constitutional:  cooperative, alert and  oriented, well developed, well nourished, in no acute distress        Skin:  warm and dry to the touch        Head:  normocephalic, no masses or lesions        Eyes:  pupils equal and round;conjunctivae and lids unremarkable;sclera white;no xanthalasma        ENT:  no pallor or cyanosis, dentition good        Neck:  JVP normal        Chest:  normal breath sounds, clear to auscultation, normal A-P diameter, normal symmetry, normal respiratory excursion, no use of accessory muscles        Cardiac: regular rhythm, normal S1/S2, no S3 or S4, apical impulse not displaced, no murmurs, gallops or rubs                  Abdomen:      benign    Extremities and Back:  no deformities, clubbing, cyanosis, erythema observed;no edema;no spinal abnormalities noted;normal muscle strength and tone        Neurological:  no gross motor deficits;affect appropriate                 Medications:     Current Outpatient Prescriptions   Medication Sig Dispense Refill     aspirin 81 MG EC tablet Take 1 tablet (81 mg) by mouth daily       atorvastatin (LIPITOR) 40 MG tablet Take 1 tablet (40 mg) by mouth At Bedtime 30 tablet 11     clopidogrel (PLAVIX) 75 MG tablet Take 1 tablet (75 mg) by mouth daily 90 tablet 3     isosorbide mononitrate (IMDUR) 30 MG 24 hr tablet Take 1 tablet (30 mg) by mouth daily 30 tablet 11     nitroGLYcerin (NITROSTAT) 0.4 MG sublingual tablet For chest pain place 1 tablet under the tongue every 5 minutes for 3 doses. If symptoms persist 5 minutes after 1st dose call 911. 25 tablet 3                Data:   All laboratory data reviewed  Results for orders placed or performed in visit on 08/10/18 (from the past 24 hour(s))   Lipid Profile   Result Value Ref Range    Cholesterol 95 <200 mg/dL    Triglycerides 61 <150 mg/dL    HDL Cholesterol 53 >39 mg/dL    LDL Cholesterol Calculated 30 <100 mg/dL    Non HDL Cholesterol 42 <130 mg/dL   ALT   Result Value Ref Range    ALT 32 0 - 70 U/L       All laboratory data  reviewed  Lab Results   Component Value Date    CHOL 95 08/10/2018     Lab Results   Component Value Date    HDL 53 08/10/2018     Lab Results   Component Value Date    LDL 30 08/10/2018     Lab Results   Component Value Date    TRIG 61 08/10/2018     No results found for: CHOLHDLRATIO  No results found for: TSH  Last Basic Metabolic Panel:  Lab Results   Component Value Date     06/26/2018      Lab Results   Component Value Date    POTASSIUM 4.0 06/26/2018     Lab Results   Component Value Date    CHLORIDE 105 06/26/2018     Lab Results   Component Value Date    MARIZA 9.2 06/26/2018     Lab Results   Component Value Date    CO2 29 06/26/2018     Lab Results   Component Value Date    BUN 13 06/26/2018     Lab Results   Component Value Date    CR 0.87 06/26/2018     Lab Results   Component Value Date    GLC 85 06/26/2018     Lab Results   Component Value Date    WBC 8.4 06/12/2018     Lab Results   Component Value Date    RBC 4.91 06/12/2018     Lab Results   Component Value Date    HGB 15.8 06/12/2018     Lab Results   Component Value Date    HCT 45.0 06/12/2018     Lab Results   Component Value Date    MCV 92 06/12/2018     Lab Results   Component Value Date    MCH 32.2 06/12/2018     Lab Results   Component Value Date    MCHC 35.1 06/12/2018     Lab Results   Component Value Date    RDW 12.1 06/12/2018     Lab Results   Component Value Date     06/12/2018                 Thank you for allowing me to participate in the care of your patient.      Sincerely,     Ravindra Lopez MD     [facility]    cc:   Tye Bran PA-C  [address] [place] [address]

## 2018-08-10 NOTE — PATIENT INSTRUCTIONS
"Tylenol okay.  Ibuprofen and naprosyn, not as good (interacts with the aspirin and clopidogrel which can cause more bleeding or make your aspirin not work as well).    No sudafed. Other decongestants okay.  Antihistamines okay.    If you forget your atorvastatin in the evening, you may \"make up\" the dose in the morning.    If you get discomfort that doesn't make you feel poorly and only lasts for seconds, probably not anything concerning.    If you do feel poorly or it escalates with exercise, then let us know.    See Tye fernandez in 6 months.    "

## 2018-08-10 NOTE — PROGRESS NOTES
Cardiology Progress Note          Assessment and Plan:     1. Coronary artery disease status post PCI of the LAD 6/11/2018 for atherosclerotic coronary disease with component of spasm    Gave prescription for sublingual nitroglycerin if needed.    Continue current cardiovascular medications.  LDL looks very good today on 40 milligrams of atorvastatin.    Follow-up in 6 months with Tye, myself in 1 year as he has not yet established care with a primary physician.      This note was transcribed using electronic voice recognition software and there may be typographical errors present.                Interval History:   The patient is a very pleasant 42 year old previous smoker with spasm and KATLYN to the LAD 6/11/2018.  He has been able to quit smoking.  He still uses recreational marijuana.  He has not had any recurrent chest discomfort similar to his previous angina.  He gets fleeting intermittent chest discomfort that may be consistent with PACs or PVCs.  He has consistent caffeine intake, but no escalation.  He feels very well overall.  He is participating in cardiac rehab.                     Review of Systems:   Review of Systems:  Skin:  Positive for bruising   Eyes:  Negative    ENT:  Positive for sinus trouble  Respiratory:  Negative    Cardiovascular:    Positive for;palpitations  Gastroenterology: Negative    Genitourinary:  Negative    Musculoskeletal:  Negative    Neurologic:  Negative    Psychiatric:  Negative    Heme/Lymph/Imm:  Negative    Endocrine:  Negative                Physical Exam:     Vitals: /78 (BP Location: Right arm, Patient Position: Chair, Cuff Size: Adult Regular)  Pulse 76  Ht 1.829 m (6')  Wt 85.6 kg (188 lb 12.8 oz)  BMI 25.61 kg/m2  Constitutional:  cooperative, alert and oriented, well developed, well nourished, in no acute distress        Skin:  warm and dry to the touch        Head:  normocephalic, no masses or lesions        Eyes:  pupils equal and round;conjunctivae and  lids unremarkable;sclera white;no xanthalasma        ENT:  no pallor or cyanosis, dentition good        Neck:  JVP normal        Chest:  normal breath sounds, clear to auscultation, normal A-P diameter, normal symmetry, normal respiratory excursion, no use of accessory muscles        Cardiac: regular rhythm, normal S1/S2, no S3 or S4, apical impulse not displaced, no murmurs, gallops or rubs                  Abdomen:      benign    Extremities and Back:  no deformities, clubbing, cyanosis, erythema observed;no edema;no spinal abnormalities noted;normal muscle strength and tone        Neurological:  no gross motor deficits;affect appropriate                 Medications:     Current Outpatient Prescriptions   Medication Sig Dispense Refill     aspirin 81 MG EC tablet Take 1 tablet (81 mg) by mouth daily       atorvastatin (LIPITOR) 40 MG tablet Take 1 tablet (40 mg) by mouth At Bedtime 30 tablet 11     clopidogrel (PLAVIX) 75 MG tablet Take 1 tablet (75 mg) by mouth daily 90 tablet 3     isosorbide mononitrate (IMDUR) 30 MG 24 hr tablet Take 1 tablet (30 mg) by mouth daily 30 tablet 11     nitroGLYcerin (NITROSTAT) 0.4 MG sublingual tablet For chest pain place 1 tablet under the tongue every 5 minutes for 3 doses. If symptoms persist 5 minutes after 1st dose call 911. 25 tablet 3                Data:   All laboratory data reviewed  Results for orders placed or performed in visit on 08/10/18 (from the past 24 hour(s))   Lipid Profile   Result Value Ref Range    Cholesterol 95 <200 mg/dL    Triglycerides 61 <150 mg/dL    HDL Cholesterol 53 >39 mg/dL    LDL Cholesterol Calculated 30 <100 mg/dL    Non HDL Cholesterol 42 <130 mg/dL   ALT   Result Value Ref Range    ALT 32 0 - 70 U/L       All laboratory data reviewed  Lab Results   Component Value Date    CHOL 95 08/10/2018     Lab Results   Component Value Date    HDL 53 08/10/2018     Lab Results   Component Value Date    LDL 30 08/10/2018     Lab Results   Component  Value Date    TRIG 61 08/10/2018     No results found for: CHOLHDLRATIO  No results found for: TSH  Last Basic Metabolic Panel:  Lab Results   Component Value Date     06/26/2018      Lab Results   Component Value Date    POTASSIUM 4.0 06/26/2018     Lab Results   Component Value Date    CHLORIDE 105 06/26/2018     Lab Results   Component Value Date    MARIZA 9.2 06/26/2018     Lab Results   Component Value Date    CO2 29 06/26/2018     Lab Results   Component Value Date    BUN 13 06/26/2018     Lab Results   Component Value Date    CR 0.87 06/26/2018     Lab Results   Component Value Date    GLC 85 06/26/2018     Lab Results   Component Value Date    WBC 8.4 06/12/2018     Lab Results   Component Value Date    RBC 4.91 06/12/2018     Lab Results   Component Value Date    HGB 15.8 06/12/2018     Lab Results   Component Value Date    HCT 45.0 06/12/2018     Lab Results   Component Value Date    MCV 92 06/12/2018     Lab Results   Component Value Date    MCH 32.2 06/12/2018     Lab Results   Component Value Date    MCHC 35.1 06/12/2018     Lab Results   Component Value Date    RDW 12.1 06/12/2018     Lab Results   Component Value Date     06/12/2018

## 2018-08-10 NOTE — MR AVS SNAPSHOT
"              After Visit Summary   8/10/2018    Jeremy Lainez    MRN: [id number]           Patient Information     Date Of Birth          1975        Visit Information        Provider Department      8/10/2018 9:15 AM Ravindra Lopez MD [facility]        Today's Diagnoses     NSTEMI (non-ST elevated myocardial infarction) (H)          Care Instructions    Tylenol okay.  Ibuprofen and naprosyn, not as good (interacts with the aspirin and clopidogrel which can cause more bleeding or make your aspirin not work as well).    No sudafed. Other decongestants okay.  Antihistamines okay.    If you forget your atorvastatin in the evening, you may \"make up\" the dose in the morning.    If you get discomfort that doesn't make you feel poorly and only lasts for seconds, probably not anything concerning.    If you do feel poorly or it escalates with exercise, then let us know.    See Tye back in 6 months.            Follow-ups after your visit        Additional Services     Follow-Up with Cardiac Advanced Practice Provider           Follow-Up with Cardiologist                 Your next 10 appointments already scheduled     Aug 13, 2018  6:30 AM CDT   Cardiac Treatment with  Cardiac Rehab 1   [facility] ([facility])    [address]  [facility] [address]   [phone]            Aug 15, 2018  8:00 AM CDT   Cardiac Treatment with  Cardiac Rehab 1   [facility]iac [facility] ([facility])    [address]  [facility] [address]   [phone]            Aug 16, 2018  6:30 AM CDT   Cardiac Treatment with  Cardiac Rehab 1   [facility] ([facility])    [address]  [facility] [address]   [phone]            Aug 20, 2018  6:30 AM CDT   Cardiac Treatment with  Cardiac Rehab 1   [facility] Cardiac [facility] ([facility])    [address]  [place] [address]   [phone]            Aug 22, 2018  8:00 AM CDT   Cardiac Treatment with Sh Cardiac Rehab 1   [facility] Cardiac Rehab ([facility])    [address]  [place] [address]   [phone]              Future tests that were ordered for you today     Open Future Orders        Priority Expected Expires Ordered    Follow-Up with Cardiologist Routine 8/10/2019 8/11/2019 8/10/2018    Follow-Up with Cardiac Advanced Practice Provider Routine 2/6/2019 8/10/2019 8/10/2018            Who to contact     If you have questions or need follow up information about today's clinic visit or your schedule please contact [facility] directly at [phone].  Normal or non-critical lab and imaging results will be communicated to you by MyChart, letter or phone within 4 business days after the clinic has received the results. If you do not hear from us within 7 days, please contact the clinic through [employer] or phone. If you have a critical or abnormal lab result, we will notify you by phone as soon as possible.  Submit refill requests through [employer] or call your pharmacy and they will forward the refill request to us. Please allow 3 business days for your refill to be completed.          Additional Information About Your Visit        [employer] Information     [employer] gives you secure access to your electronic health record. If you see a primary care provider, you can also send messages to your care team and make appointments. If you have questions, please call your primary care clinic.  If you do not have a primary care provider, please call [phone] and they will assist you.        Care EveryWhere ID     This is your Care EveryWhere ID. This could be used by other organizations to access your [place] medical records  [id number]        Your Vitals Were     Pulse Height  BMI (Body Mass Index)             76 1.829 m (6') 25.61 kg/m2          Blood Pressure from Last 3 Encounters:   08/10/18 122/78   06/26/18 112/78   06/12/18 102/62    Weight from Last 3 Encounters:   08/10/18 85.6 kg (188 lb 12.8 oz)   06/26/18 83.9 kg (185 lb)   06/08/18 82.7 kg (182 lb 4.8 oz)              We Performed the Following     Follow-Up with Cardiologist          Today's Medication Changes          These changes are accurate as of 8/10/18  9:41 AM.  If you have any questions, ask your nurse or doctor.               Start taking these medicines.        Dose/Directions    nitroGLYcerin 0.4 MG sublingual tablet   Commonly known as:  NITROSTAT   Used for:  NSTEMI (non-ST elevated myocardial infarction) (H)   Started by:  Ravindra Lopez MD        For chest pain place 1 tablet under the tongue every 5 minutes for 3 doses. If symptoms persist 5 minutes after 1st dose call 911.   Quantity:  25 tablet   Refills:  3            Where to get your medicines      These medications were sent to [place] [address] [place] [address]  [address] [facility] [address]     Phone:  [phone]     nitroGLYcerin 0.4 MG sublingual tablet                Primary Care Provider Fax #    Physician No Ref-Primary [phone]       No address on file        Equal Access to Services     CORNELIO GIBSON AH: Bertin tapia Soibrahima, waaxda luqadaha, qaybta kaalmada gillian, maggie jonas. So [facility] [phone].    ATENCIÓN: Si habla español, tiene a garza disposición servicios gratuitos de asistencia lingüística. Linaame al [phone].    We comply with applicable federal civil rights laws and [place] laws. We do not discriminate on the basis of race, color, national origin, age, disability, sex, sexual orientation, or gender identity.            Thank you!     Thank you for choosing [facility]  for your care. Our goal is always  to provide you with excellent care. Hearing back from our patients is one way we can continue to improve our services. Please take a few minutes to complete the written survey that you may receive in the mail after your visit with us. Thank you!             Your Updated Medication List - Protect others around you: Learn how to safely use, store and throw away your medicines at www.disposemymeds.org.          This list is accurate as of 8/10/18  9:41 AM.  Always use your most recent med list.                   Brand Name Dispense Instructions for use Diagnosis    aspirin 81 MG EC tablet      Take 1 tablet (81 mg) by mouth daily    Acute ST elevation myocardial infarction (STEMI) involving left anterior descending (LAD) coronary artery (H)       atorvastatin 40 MG tablet    LIPITOR    30 tablet    Take 1 tablet (40 mg) by mouth At Bedtime    Acute ST elevation myocardial infarction (STEMI) involving left anterior descending (LAD) coronary artery (H)       clopidogrel 75 MG tablet    PLAVIX    90 tablet    Take 1 tablet (75 mg) by mouth daily    Acute ST elevation myocardial infarction (STEMI) involving left anterior descending (LAD) coronary artery (H)       isosorbide mononitrate 30 MG 24 hr tablet    IMDUR    30 tablet    Take 1 tablet (30 mg) by mouth daily    Acute ST elevation myocardial infarction (STEMI) involving left anterior descending (LAD) coronary artery (H)       nitroGLYcerin 0.4 MG sublingual tablet    NITROSTAT    25 tablet    For chest pain place 1 tablet under the tongue every 5 minutes for 3 doses. If symptoms persist 5 minutes after 1st dose call 911.    NSTEMI (non-ST elevated myocardial infarction) (H)

## 2018-08-13 ENCOUNTER — HOSPITAL ENCOUNTER (OUTPATIENT)
Dept: CARDIAC REHAB | Facility: CLINIC | Age: 43
End: 2018-08-13
Attending: PHYSICIAN ASSISTANT
Payer: COMMERCIAL

## 2018-08-13 PROCEDURE — 40000116 ZZH STATISTIC OP CR VISIT

## 2018-08-13 PROCEDURE — 93798 PHYS/QHP OP CAR RHAB W/ECG: CPT

## 2018-08-15 ENCOUNTER — HOSPITAL ENCOUNTER (OUTPATIENT)
Dept: CARDIAC REHAB | Facility: CLINIC | Age: 43
End: 2018-08-15
Attending: PHYSICIAN ASSISTANT
Payer: COMMERCIAL

## 2018-08-15 PROCEDURE — 40000116 ZZH STATISTIC OP CR VISIT

## 2018-08-15 PROCEDURE — 93798 PHYS/QHP OP CAR RHAB W/ECG: CPT

## 2018-08-20 ENCOUNTER — HOSPITAL ENCOUNTER (OUTPATIENT)
Dept: CARDIAC REHAB | Facility: CLINIC | Age: 43
End: 2018-08-20
Attending: PHYSICIAN ASSISTANT
Payer: COMMERCIAL

## 2018-08-20 PROCEDURE — 40000116 ZZH STATISTIC OP CR VISIT

## 2018-08-20 PROCEDURE — 93798 PHYS/QHP OP CAR RHAB W/ECG: CPT

## 2018-08-22 ENCOUNTER — HOSPITAL ENCOUNTER (OUTPATIENT)
Dept: CARDIAC REHAB | Facility: CLINIC | Age: 43
End: 2018-08-22
Attending: PHYSICIAN ASSISTANT
Payer: COMMERCIAL

## 2018-08-22 PROCEDURE — 40000116 ZZH STATISTIC OP CR VISIT

## 2018-08-22 PROCEDURE — 93798 PHYS/QHP OP CAR RHAB W/ECG: CPT

## 2018-10-26 NOTE — PROGRESS NOTES
Cardiac Rehab Discharge Summary  Jeremy Lainez  42 year old  STEMI, stent   Reason for discharge:    Patient/family request discontinuation of services.    Progress towards goals:  Goals met  Goals partially met.  Barriers to achieving goals:   discharge from facility.    Recommendation(s):    Continue home exercise program.

## 2019-04-04 ENCOUNTER — OFFICE VISIT (OUTPATIENT)
Dept: CARDIOLOGY | Facility: CLINIC | Age: 44
End: 2019-04-04
Attending: INTERNAL MEDICINE
Payer: COMMERCIAL

## 2019-04-04 VITALS
HEART RATE: 74 BPM | DIASTOLIC BLOOD PRESSURE: 72 MMHG | BODY MASS INDEX: 25.7 KG/M2 | WEIGHT: 189.7 LBS | HEIGHT: 72 IN | SYSTOLIC BLOOD PRESSURE: 124 MMHG | OXYGEN SATURATION: 99 %

## 2019-04-04 DIAGNOSIS — I25.10 CORONARY ARTERY DISEASE INVOLVING NATIVE CORONARY ARTERY OF NATIVE HEART WITHOUT ANGINA PECTORIS: Primary | ICD-10-CM

## 2019-04-04 DIAGNOSIS — I21.4 NSTEMI (NON-ST ELEVATED MYOCARDIAL INFARCTION) (H): ICD-10-CM

## 2019-04-04 PROCEDURE — 99214 OFFICE O/P EST MOD 30 MIN: CPT | Performed by: PHYSICIAN ASSISTANT

## 2019-04-04 ASSESSMENT — MIFFLIN-ST. JEOR: SCORE: 1783.6

## 2019-04-04 NOTE — LETTER
2019    Physician No Ref-Primary  No address on file    RE: Jeremy Lainez       Dear Colleague,    I had the pleasure of seeing Jeremy Lainez in the [facility].    CARDIOLOGY CLINIC PROGRESS NOTE    DOS: 19    Jeremy Lainez  : 1975, 43 year old  MRN: [id number]      History:   I had the pleasure of seeing Jeremy Lainez today in follow up.  He is a patient of Dr. Lopez.     Jeremy Lainez is a very pleasant 43 year old male with history of prior tobacco dependence, recreational marijuana use, CAD, s/p PCI 18, some coronary spasm.     He was admitted on 2018 w/ about 1 week history of intermittent substernal chest pain associated with diaphoresis.  He was sent from PMD office to [facility] ER due to abnormal ECG concerning for anterior T inversions.  Mild troponin elevation.  He was hypertensive. Cardiac catheterization 18 with 60% OM1 lesion and 40-50% p-mLAD lesion, negative FFR (0.84) across the latter.  He had recurrent chest discomfort over the weekend.  Echo 6/10/18 without definitive WMAs.     Repeat cath 18: 60% prox LAD with evidence of plaque rupture.  Acetylcholine provocation study with spasm in the prox LAD lesion without significant spasm elsewhere. Given the results and recurrence while on DAPT and medical therapy, he underwent 3.5 x 28 mm synergy KATLYN to prox LAD.   He had some bradycardia with addition of beta-blocker, so Imdur started.     He saw Dr. Lopez 8/10/18.  He was doing well at that time.  LDL was controlled at 30 on atorvastatin 40 mg.       Interval History:   He presents today for follow up.   He is doing well. No recurrence of symptoms.   He has completely stopped smoking.   No bleeding on DAPT.   No myalgias.   He has occasional mild headaches.   He is looking for a new PCP.   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 He is looking to increase activity again.       ROS:  Skin:   Negative     Eyes:  Negative    ENT:  Negative    Respiratory:  Negative    Cardiovascular:  Negative for;palpitations;chest pain;edema;fatigue;lightheadedness;dizziness    Gastroenterology: Negative    Genitourinary:  Negative    Musculoskeletal:  Negative    Neurologic:  Negative    Psychiatric:  Negative    Heme/Lymph/Imm:  Negative    Endocrine:  Negative      PAST MEDICAL HISTORY:  Past Medical History:   Diagnosis Date     CAD (coronary artery disease)      History of tobacco abuse      HTN (hypertension)      Hyperlipidemia      Marijuana use      STEMI (ST elevation myocardial infarction) (H) 06/11/2018     CAD s/p KATLYN to LAD 6/11/18       PAST SURGICAL HISTORY:  Past Surgical History:   Procedure Laterality Date     CARDIAC CATHERIZATION  06/11/2018    KATLYN to LAD       SOCIAL HISTORY:  Social History     Social History     Marital status:      Spouse name: N/A     Number of children: N/A     Years of education: N/A     Social History Main Topics     Smoking status: Former Smoker     Quit date: 6/8/2018     Smokeless tobacco: Never Used     Alcohol use Yes      Comment: 1-2 drinks a night      Drug use: None     Sexual activity: Not Asked     Other Topics Concern     None     Social History Narrative     None       FAMILY HISTORY:  No family history on file.    MEDS:   Current Outpatient Medications on File Prior to Visit:  aspirin 81 MG EC tablet Take 1 tablet (81 mg) by mouth daily   atorvastatin (LIPITOR) 40 MG tablet Take 1 tablet (40 mg) by mouth At Bedtime   clopidogrel (PLAVIX) 75 MG tablet Take 1 tablet (75 mg) by mouth daily   isosorbide mononitrate (IMDUR) 30 MG 24 hr tablet Take 1 tablet (30 mg) by mouth daily   nitroGLYcerin (NITROSTAT) 0.4 MG sublingual tablet For chest pain place 1 tablet under the tongue every 5 minutes for 3 doses. If symptoms persist 5 minutes after 1st dose call 911.     No current facility-administered medications on file prior to visit.     ALLERGIES: No Known  "Allergies    PHYSICAL EXAM:  Vitals: /72 (BP Location: Right arm, Patient Position: Sitting, Cuff Size: Adult Regular)   Pulse 74   Ht 1.829 m (6' 0.01\")   Wt 86 kg (189 lb 11.2 oz)   SpO2 99%   BMI 25.72 kg/m     Constitutional:  cooperative, alert and oriented, well developed, well nourished, in no acute distress        Skin:  warm and dry to the touch        Head:  normocephalic, no masses or lesions        Eyes:  pupils equal and round;conjunctivae and lids unremarkable;sclera white;no xanthalasma        ENT:  no pallor or cyanosis, dentition good        Neck:  JVP normal        Respiratory:  normal breath sounds, clear to auscultation, normal A-P diameter, normal symmetry, normal respiratory excursion, no use of accessory muscles        Cardiac: regular rhythm, normal S1/S2, no S3 or S4, apical impulse not displaced, no murmurs, gallops or rubs                  GI:  abdomen soft;BS normoactive;non-tender        Vascular: pulses full and equal                                      Extremities and Musculoskeletal:  no deformities, clubbing, cyanosis, erythema observed;no edema;no spinal abnormalities noted;normal muscle strength and tone        Neurological:  no gross motor deficits;affect appropriate            ASSESSMENT/PLAN:  STEMI  CAD  Vasospasm  -anterior ST elevation  -6/8/18 urgent cath: 40-50% pLAD and 60% OM1 (small vessel)  LAD not found to be flow limiting per FFR (0.84).  No PCI  -developed recurrent CP & palpitations w/ associated ST elevations in V2-V5 w/ T-wave inversions  -ECHO 6/10/18 LVEF 55% possible Mild mid/high lateral, anterior RWMA. Though no definitive WMAs  -Repeat cath 6/11/18: 60% prox LAD. Acetylcholine provocation study with spasm in the prox LAD lesion without significant spasm elsewhere. Given the results and recurrent SINDI while on DAPT and medical therapy he underwent 3.5 x 28 mm synergy KATLYN to prox LAD.   -ASA, Plavix x 1 year, statin. Also Imdur for spasm.   -Quit " smoking!   -Mediterranean style diet     HTN  -Controlled on Imdur 30 mg for BP and spasm. Could use Norvasc if needed in the future      Tobacco dependence  -quit at time of admit      Statin started  -FLP 6/11/18: , HDL 51, LDL 79, TG 52  -Started atorvastatin 40 mg  -Repeat FLP 8/10/18: TC 95, HDL 53, LDL 30, TG 61      Follow up:  Looking for PCP   Dr. Lopez in 3 months, which is about 1 year our from his stenting    Tye Bran PA-C    Thank you for allowing me to participate in the care of your patient.      Sincerely,     Tye Bran PA-C     [facility]    cc:   Ravindra Lopez MD  [address]  [place] [address]

## 2019-04-04 NOTE — PROGRESS NOTES
CARDIOLOGY CLINIC PROGRESS NOTE    DOS: 19    Jeremy Lainez  : 1975, 43 year old  MRN: [id number]      History:   I had the pleasure of seeing Jeremy Lainez today in follow up.  He is a patient of Dr. Lopez.     Jeremy Lainez is a very pleasant 43 year old male with history of prior tobacco dependence, recreational marijuana use, CAD, s/p PCI 18, some coronary spasm.     He was admitted on 2018 w/ about 1 week history of intermittent substernal chest pain associated with diaphoresis.  He was sent from PMD office to [facility] ER due to abnormal ECG concerning for anterior T inversions.  Mild troponin elevation.  He was hypertensive. Cardiac catheterization 18 with 60% OM1 lesion and 40-50% p-mLAD lesion, negative FFR (0.84) across the latter.  He had recurrent chest discomfort over the weekend.  Echo 6/10/18 without definitive WMAs.     Repeat cath 18: 60% prox LAD with evidence of plaque rupture.  Acetylcholine provocation study with spasm in the prox LAD lesion without significant spasm elsewhere. Given the results and recurrence while on DAPT and medical therapy, he underwent 3.5 x 28 mm synergy KATLYN to prox LAD.   He had some bradycardia with addition of beta-blocker, so Imdur started.     He saw Dr. Lopez 8/10/18.  He was doing well at that time.  LDL was controlled at 30 on atorvastatin 40 mg.       Interval History:   He presents today for follow up.   He is doing well. No recurrence of symptoms.   He has completely stopped smoking.   No bleeding on DAPT.   No myalgias.   He has occasional mild headaches.   He is looking for a new PCP.   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 He is looking to increase activity again.       ROS:  Skin:  Negative     Eyes:  Negative    ENT:  Negative    Respiratory:  Negative    Cardiovascular:  Negative for;palpitations;chest pain;edema;fatigue;lightheadedness;dizziness    Gastroenterology: Negative   "  Genitourinary:  Negative    Musculoskeletal:  Negative    Neurologic:  Negative    Psychiatric:  Negative    Heme/Lymph/Imm:  Negative    Endocrine:  Negative      PAST MEDICAL HISTORY:  Past Medical History:   Diagnosis Date     CAD (coronary artery disease)      History of tobacco abuse      HTN (hypertension)      Hyperlipidemia      Marijuana use      STEMI (ST elevation myocardial infarction) (H) 06/11/2018     CAD s/p KATLYN to LAD 6/11/18       PAST SURGICAL HISTORY:  Past Surgical History:   Procedure Laterality Date     CARDIAC CATHERIZATION  06/11/2018    KATLYN to LAD       SOCIAL HISTORY:  Social History     Social History     Marital status:      Spouse name: N/A     Number of children: N/A     Years of education: N/A     Social History Main Topics     Smoking status: Former Smoker     Quit date: 6/8/2018     Smokeless tobacco: Never Used     Alcohol use Yes      Comment: 1-2 drinks a night      Drug use: None     Sexual activity: Not Asked     Other Topics Concern     None     Social History Narrative     None       FAMILY HISTORY:  No family history on file.    MEDS:   Current Outpatient Medications on File Prior to Visit:  aspirin 81 MG EC tablet Take 1 tablet (81 mg) by mouth daily   atorvastatin (LIPITOR) 40 MG tablet Take 1 tablet (40 mg) by mouth At Bedtime   clopidogrel (PLAVIX) 75 MG tablet Take 1 tablet (75 mg) by mouth daily   isosorbide mononitrate (IMDUR) 30 MG 24 hr tablet Take 1 tablet (30 mg) by mouth daily   nitroGLYcerin (NITROSTAT) 0.4 MG sublingual tablet For chest pain place 1 tablet under the tongue every 5 minutes for 3 doses. If symptoms persist 5 minutes after 1st dose call 911.     No current facility-administered medications on file prior to visit.     ALLERGIES: No Known Allergies    PHYSICAL EXAM:  Vitals: /72 (BP Location: Right arm, Patient Position: Sitting, Cuff Size: Adult Regular)   Pulse 74   Ht 1.829 m (6' 0.01\")   Wt 86 kg (189 lb 11.2 oz)   SpO2 99%   " BMI 25.72 kg/m    Constitutional:  cooperative, alert and oriented, well developed, well nourished, in no acute distress        Skin:  warm and dry to the touch        Head:  normocephalic, no masses or lesions        Eyes:  pupils equal and round;conjunctivae and lids unremarkable;sclera white;no xanthalasma        ENT:  no pallor or cyanosis, dentition good        Neck:  JVP normal        Respiratory:  normal breath sounds, clear to auscultation, normal A-P diameter, normal symmetry, normal respiratory excursion, no use of accessory muscles        Cardiac: regular rhythm, normal S1/S2, no S3 or S4, apical impulse not displaced, no murmurs, gallops or rubs                  GI:  abdomen soft;BS normoactive;non-tender        Vascular: pulses full and equal                                      Extremities and Musculoskeletal:  no deformities, clubbing, cyanosis, erythema observed;no edema;no spinal abnormalities noted;normal muscle strength and tone        Neurological:  no gross motor deficits;affect appropriate            ASSESSMENT/PLAN:  STEMI  CAD  Vasospasm  -anterior ST elevation  -6/8/18 urgent cath: 40-50% pLAD and 60% OM1 (small vessel)  LAD not found to be flow limiting per FFR (0.84).  No PCI  -developed recurrent CP & palpitations w/ associated ST elevations in V2-V5 w/ T-wave inversions  -ECHO 6/10/18 LVEF 55% possible Mild mid/high lateral, anterior RWMA. Though no definitive WMAs  -Repeat cath 6/11/18: 60% prox LAD. Acetylcholine provocation study with spasm in the prox LAD lesion without significant spasm elsewhere. Given the results and recurrent SINDI while on DAPT and medical therapy he underwent 3.5 x 28 mm synergy KATLYN to prox LAD.   -ASA, Plavix x 1 year, statin. Also Imdur for spasm.   -Quit smoking!   -Mediterranean style diet     HTN  -Controlled on Imdur 30 mg for BP and spasm. Could use Norvasc if needed in the future      Tobacco dependence  -quit at time of admit      Statin started  -FLP  6/11/18: , HDL 51, LDL 79, TG 52  -Started atorvastatin 40 mg  -Repeat FLP 8/10/18: TC 95, HDL 53, LDL 30, TG 61      Follow up:  Looking for PCP   Dr. Lopez in 3 months, which is about 1 year our from his stenting    Tye Bran PA-C

## 2019-04-24 ENCOUNTER — TELEPHONE (OUTPATIENT)
Dept: CARDIOLOGY | Facility: CLINIC | Age: 44
End: 2019-04-24

## 2019-04-24 DIAGNOSIS — I25.10 CORONARY ARTERY DISEASE INVOLVING NATIVE CORONARY ARTERY OF NATIVE HEART WITHOUT ANGINA PECTORIS: Primary | ICD-10-CM

## 2019-04-24 NOTE — TELEPHONE ENCOUNTER
Pt reporting chest tightness/pressure. Does not think it's pain but is very different. Doesn't feel exactly like his last heart episodes when he had stenting in 6/2018, but has been going on for over a week. Is difficult for him to explain. It comes and goes, no correlation w/ exertion but more at night. Does not feel like muscle pain or heartburn. Has not tried nitroglycerin.      I advised could see his PCP. Pt would like Tye's opinion on it. Scheduled to see Tye tomorrow at 1310. Advised if worsening, should go to ED. Pt understood.   Tracy MADISON

## 2019-04-24 NOTE — TELEPHONE ENCOUNTER
Health Call Center    Phone Message    May a detailed message be left on voicemail: yes    Reason for Call: Symptoms or Concerns     If patient has red-flag symptoms, warm transfer to triage line    Current symptom or concern: Randomly occurring chest tightness/pressure. No pain. Pt can't identify anything that seems to trigger it. Pt is not actively experiencing this symptom right now.    Symptoms have been present for:  5 - 6 day(s)    Has patient previously been seen for this? Yes    By : Tye Bran    Date: 04/04/19    Are there any new or worsening symptoms? No      Action Taken: Message routed to:  [facility] ([facility] Cardiology

## 2019-04-25 ENCOUNTER — OFFICE VISIT (OUTPATIENT)
Dept: CARDIOLOGY | Facility: CLINIC | Age: 44
End: 2019-04-25
Payer: COMMERCIAL

## 2019-04-25 VITALS
BODY MASS INDEX: 25.18 KG/M2 | HEIGHT: 72 IN | HEART RATE: 72 BPM | WEIGHT: 185.9 LBS | DIASTOLIC BLOOD PRESSURE: 80 MMHG | SYSTOLIC BLOOD PRESSURE: 130 MMHG

## 2019-04-25 DIAGNOSIS — I25.10 CORONARY ARTERY DISEASE INVOLVING NATIVE CORONARY ARTERY OF NATIVE HEART WITHOUT ANGINA PECTORIS: ICD-10-CM

## 2019-04-25 DIAGNOSIS — I21.02 ACUTE ST ELEVATION MYOCARDIAL INFARCTION (STEMI) INVOLVING LEFT ANTERIOR DESCENDING (LAD) CORONARY ARTERY (H): ICD-10-CM

## 2019-04-25 PROCEDURE — 99214 OFFICE O/P EST MOD 30 MIN: CPT | Performed by: PHYSICIAN ASSISTANT

## 2019-04-25 PROCEDURE — 93000 ELECTROCARDIOGRAM COMPLETE: CPT | Performed by: PHYSICIAN ASSISTANT

## 2019-04-25 RX ORDER — ISOSORBIDE MONONITRATE 30 MG/1
45 TABLET, EXTENDED RELEASE ORAL DAILY
Qty: 45 TABLET | Refills: 11 | Status: SHIPPED | OUTPATIENT
Start: 2019-04-25 | End: 2019-08-20

## 2019-04-25 ASSESSMENT — MIFFLIN-ST. JEOR: SCORE: 1766.24

## 2019-04-25 NOTE — LETTER
2019    Physician No Ref-Primary  No address on file    RE: Jeremy Lainez       Dear Colleague,    I had the pleasure of seeing Jeremy Lainez in the [facility].    CARDIOLOGY CLINIC PROGRESS NOTE    DOS: 19     Jeremy Lainez  : 1975, 43 year old  MRN: [id number]      History:   I had the pleasure of seeing Jeremy Lainez today in follow up.  He is a patient of Dr. Lopez.     Jeremy Lainez is a very pleasant 43 year old male with history of prior tobacco dependence, recreational marijuana use, CAD, s/p PCI 18, some coronary spasm.     He was admitted on 2018 w/ about 1 week history of intermittent substernal chest pain associated with diaphoresis.  He was sent from PMD office to [facility] ER due to abnormal ECG concerning for anterior T inversions.  Mild troponin elevation.  He was hypertensive. Cardiac catheterization 18 with 60% OM1 lesion and 40-50% p-mLAD lesion, negative FFR (0.84) across the latter.  He had recurrent chest discomfort over the weekend.  Echo 6/10/18 without definitive WMAs.     Repeat cath 18: 60% prox LAD with evidence of plaque rupture.  Acetylcholine provocation study with spasm in the prox LAD lesion without significant spasm elsewhere. Given the results and recurrence while on DAPT and medical therapy, he underwent 3.5 x 28 mm synergy KATLYN to prox LAD.   He had some bradycardia with addition of beta-blocker, so Imdur started.     He saw Dr. Lopez 8/10/18.  He was doing well at that time.  LDL was controlled at 30 on atorvastatin 40 mg.     I saw Anthony 19.  He was doing well without concerns.  BP controlled.     19 pt called c/o week of chest tightness and pressure.  An appointment was made.     Interval History:   He presents today for follow up.   He tells me about 1 week ago he started to have some chest tightness and pressure.  It is not pain, which is what he had in 2018.  It is new, though, since he had his  stent.  It is not exertional.  It can last from minutes to hours.  It can occur any time of day.  It does not seem associated with food.  It can be in his central chest, the left chest or even under his left rib cage.  Some days he does not have any symptoms.     He has completely stopped smoking.   No bleeding on DAPT.   No myalgias.   He has occasional mild headaches.   He is looking for a new PCP.   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 He is looking to increase activity again.       ROS:  Skin:  Negative     Eyes:  Negative    ENT:  Negative    Respiratory:  Negative shortness of breath;dyspnea on exertion   Cardiovascular:    fatigue;Positive for  Gastroenterology: Negative    Genitourinary:  Negative    Musculoskeletal:  Negative    Neurologic:  Negative    Psychiatric:  Positive for sleep disturbances  Heme/Lymph/Imm:  Negative    Endocrine:  Negative      PAST MEDICAL HISTORY:  Past Medical History:   Diagnosis Date     CAD (coronary artery disease)      History of tobacco abuse      HTN (hypertension)      Hyperlipidemia      Marijuana use      STEMI (ST elevation myocardial infarction) (H) 06/11/2018     CAD s/p KATLYN to LAD 6/11/18       PAST SURGICAL HISTORY:  Past Surgical History:   Procedure Laterality Date     CARDIAC CATHERIZATION  06/11/2018    AKTLYN to LAD       SOCIAL HISTORY:  Social History     Social History     Marital status:      Spouse name: N/A     Number of children: N/A     Years of education: N/A     Social History Main Topics     Smoking status: Former Smoker     Quit date: 6/8/2018     Smokeless tobacco: Never Used     Alcohol use Yes      Comment: 1-2 drinks a night      Drug use: None     Sexual activity: Not Asked     Other Topics Concern     None     Social History Narrative     None       FAMILY HISTORY:  History reviewed. No pertinent family history.    MEDS:   Current Outpatient Medications on File Prior to Visit:  aspirin 81  MG EC tablet Take 1 tablet (81 mg) by mouth daily   atorvastatin (LIPITOR) 40 MG tablet Take 1 tablet (40 mg) by mouth At Bedtime   clopidogrel (PLAVIX) 75 MG tablet Take 1 tablet (75 mg) by mouth daily   nitroGLYcerin (NITROSTAT) 0.4 MG sublingual tablet For chest pain place 1 tablet under the tongue every 5 minutes for 3 doses. If symptoms persist 5 minutes after 1st dose call 911.     No current facility-administered medications on file prior to visit.     ALLERGIES: No Known Allergies    PHYSICAL EXAM:  Vitals: /80 (BP Location: Right arm, Patient Position: Sitting, Cuff Size: Adult Regular)   Pulse 72   Ht 1.829 m (6')   Wt 84.3 kg (185 lb 14.4 oz)   BMI 25.21 kg/m     Constitutional:  cooperative, alert and oriented, well developed, well nourished, in no acute distress        Skin:  warm and dry to the touch        Head:  normocephalic, no masses or lesions        Eyes:  pupils equal and round;conjunctivae and lids unremarkable;sclera white;no xanthalasma        ENT:  no pallor or cyanosis, dentition good        Neck:  JVP normal        Respiratory:  normal breath sounds, clear to auscultation, normal A-P diameter, normal symmetry, normal respiratory excursion, no use of accessory muscles        Cardiac: regular rhythm, normal S1/S2, no S3 or S4, apical impulse not displaced, no murmurs, gallops or rubs                  GI:  abdomen soft;BS normoactive;non-tender        Vascular: pulses full and equal                                      Extremities and Musculoskeletal:  no deformities, clubbing, cyanosis, erythema observed;no edema;no spinal abnormalities noted;normal muscle strength and tone        Neurological:  no gross motor deficits;affect appropriate          DIAGNOSTICS:  ECG 4/25/19:  SR, no ischemic changes    ASSESSMENT/PLAN:  STEMI  CAD  Vasospasm  - anterior ST elevation  - 6/8/18 urgent cath: 40-50% pLAD and 60% OM1 (small vessel)  LAD not found to be flow limiting per FFR (0.84).  No  PCI  - developed recurrent CP & palpitations w/ associated ST elevations in V2-V5 w/ T-wave inversions  - ECHO 6/10/18 LVEF 55% possible Mild mid/high lateral, anterior RWMA. Though no definitive WMAs  - Repeat cath 6/11/18: 60% prox LAD. Acetylcholine provocation study with spasm in the prox LAD lesion without significant spasm elsewhere. Given the results and recurrent SINDI while on DAPT and medical therapy he underwent 3.5 x 28 mm synergy KATLYN to prox LAD.   - Has been on ASA, Plavix x 1 year, statin. Also Imdur 30 mg for spasm.   - With some atypical chest heaviness, will trial increase in Imdur to 45 mg.  He will follow up and we will consider stress testing  - Continued smoking cessation   - Mediterranean style diet     HTN  - Controlled on Imdur 30 mg for BP and spasm. Increasing to 45 mg for some chest heaviness  - Could use Norvasc if needed in the future      Tobacco dependence  - quit at time of admit      Statin started  - FLP 6/11/18: , HDL 51, LDL 79, TG 52  - Started atorvastatin 40 mg  - Repeat FLP 8/10/18: TC 95, HDL 53, LDL 30, TG 61      Follow up:  Looking for PCP   Dr. Lopez in about late June or July, which is about 1 year our from his stenting    Tye Bran PA-C      Thank you for allowing me to participate in the care of your patient.      Sincerely,     Tye Bran PA-C     [facility]    cc:   Tye Bran PA-C  [address] [place] [address]

## 2019-04-25 NOTE — PATIENT INSTRUCTIONS
Due to the new chest pressure/heaviness, trial increasing isosorbide to 45 mg (1.5 tablets) daily.   If this resolves the symptoms, we will continue this.   If you still have symptoms, we will likely proceed with a stress test.     If symptoms worsen, please call here or be seen in an ER.

## 2019-04-25 NOTE — PROGRESS NOTES
CARDIOLOGY CLINIC PROGRESS NOTE    DOS: 19     Jeremy Lainez  : 1975, 43 year old  MRN: [id number]      History:   I had the pleasure of seeing Jeremy Lainez today in follow up.  He is a patient of Dr. Lopez.     Jeremy Lainez is a very pleasant 43 year old male with history of prior tobacco dependence, recreational marijuana use, CAD, s/p PCI 18, some coronary spasm.     He was admitted on 2018 w/ about 1 week history of intermittent substernal chest pain associated with diaphoresis.  He was sent from PMD office to [facility] ER due to abnormal ECG concerning for anterior T inversions.  Mild troponin elevation.  He was hypertensive. Cardiac catheterization 18 with 60% OM1 lesion and 40-50% p-mLAD lesion, negative FFR (0.84) across the latter.  He had recurrent chest discomfort over the weekend.  Echo 6/10/18 without definitive WMAs.     Repeat cath 18: 60% prox LAD with evidence of plaque rupture.  Acetylcholine provocation study with spasm in the prox LAD lesion without significant spasm elsewhere. Given the results and recurrence while on DAPT and medical therapy, he underwent 3.5 x 28 mm synergy KATLYN to prox LAD.   He had some bradycardia with addition of beta-blocker, so Imdur started.     He saw Dr. Lopez 8/10/18.  He was doing well at that time.  LDL was controlled at 30 on atorvastatin 40 mg.     I saw Anthony 19.  He was doing well without concerns.  BP controlled.     19 pt called c/o week of chest tightness and pressure.  An appointment was made.     Interval History:   He presents today for follow up.   He tells me about 1 week ago he started to have some chest tightness and pressure.  It is not pain, which is what he had in 2018.  It is new, though, since he had his stent.  It is not exertional.  It can last from minutes to hours.  It can occur any time of day.  It does not seem associated with food.  It can be in his central chest, the left chest or even under his  left rib cage.  Some days he does not have any symptoms.     He has completely stopped smoking.   No bleeding on DAPT.   No myalgias.   He has occasional mild headaches.   He is looking for a new PCP.   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 He is looking to increase activity again.       ROS:  Skin:  Negative     Eyes:  Negative    ENT:  Negative    Respiratory:  Negative shortness of breath;dyspnea on exertion   Cardiovascular:    fatigue;Positive for  Gastroenterology: Negative    Genitourinary:  Negative    Musculoskeletal:  Negative    Neurologic:  Negative    Psychiatric:  Positive for sleep disturbances  Heme/Lymph/Imm:  Negative    Endocrine:  Negative      PAST MEDICAL HISTORY:  Past Medical History:   Diagnosis Date     CAD (coronary artery disease)      History of tobacco abuse      HTN (hypertension)      Hyperlipidemia      Marijuana use      STEMI (ST elevation myocardial infarction) (H) 06/11/2018     CAD s/p KATLNY to LAD 6/11/18       PAST SURGICAL HISTORY:  Past Surgical History:   Procedure Laterality Date     CARDIAC CATHERIZATION  06/11/2018    KATLYN to LAD       SOCIAL HISTORY:  Social History     Social History     Marital status:      Spouse name: N/A     Number of children: N/A     Years of education: N/A     Social History Main Topics     Smoking status: Former Smoker     Quit date: 6/8/2018     Smokeless tobacco: Never Used     Alcohol use Yes      Comment: 1-2 drinks a night      Drug use: None     Sexual activity: Not Asked     Other Topics Concern     None     Social History Narrative     None       FAMILY HISTORY:  History reviewed. No pertinent family history.    MEDS:   Current Outpatient Medications on File Prior to Visit:  aspirin 81 MG EC tablet Take 1 tablet (81 mg) by mouth daily   atorvastatin (LIPITOR) 40 MG tablet Take 1 tablet (40 mg) by mouth At Bedtime   clopidogrel (PLAVIX) 75 MG tablet Take 1 tablet (75 mg) by mouth daily    nitroGLYcerin (NITROSTAT) 0.4 MG sublingual tablet For chest pain place 1 tablet under the tongue every 5 minutes for 3 doses. If symptoms persist 5 minutes after 1st dose call 911.     No current facility-administered medications on file prior to visit.     ALLERGIES: No Known Allergies    PHYSICAL EXAM:  Vitals: /80 (BP Location: Right arm, Patient Position: Sitting, Cuff Size: Adult Regular)   Pulse 72   Ht 1.829 m (6')   Wt 84.3 kg (185 lb 14.4 oz)   BMI 25.21 kg/m    Constitutional:  cooperative, alert and oriented, well developed, well nourished, in no acute distress        Skin:  warm and dry to the touch        Head:  normocephalic, no masses or lesions        Eyes:  pupils equal and round;conjunctivae and lids unremarkable;sclera white;no xanthalasma        ENT:  no pallor or cyanosis, dentition good        Neck:  JVP normal        Respiratory:  normal breath sounds, clear to auscultation, normal A-P diameter, normal symmetry, normal respiratory excursion, no use of accessory muscles        Cardiac: regular rhythm, normal S1/S2, no S3 or S4, apical impulse not displaced, no murmurs, gallops or rubs                  GI:  abdomen soft;BS normoactive;non-tender        Vascular: pulses full and equal                                      Extremities and Musculoskeletal:  no deformities, clubbing, cyanosis, erythema observed;no edema;no spinal abnormalities noted;normal muscle strength and tone        Neurological:  no gross motor deficits;affect appropriate          DIAGNOSTICS:  ECG 4/25/19:  SR, no ischemic changes    ASSESSMENT/PLAN:  STEMI  CAD  Vasospasm  - anterior ST elevation  - 6/8/18 urgent cath: 40-50% pLAD and 60% OM1 (small vessel)  LAD not found to be flow limiting per FFR (0.84).  No PCI  - developed recurrent CP & palpitations w/ associated ST elevations in V2-V5 w/ T-wave inversions  - ECHO 6/10/18 LVEF 55% possible Mild mid/high lateral, anterior RWMA. Though no definitive WMAs  -  Repeat cath 6/11/18: 60% prox LAD. Acetylcholine provocation study with spasm in the prox LAD lesion without significant spasm elsewhere. Given the results and recurrent SINDI while on DAPT and medical therapy he underwent 3.5 x 28 mm synergy KATLYN to prox LAD.   - Has been on ASA, Plavix x 1 year, statin. Also Imdur 30 mg for spasm.   - With some atypical chest heaviness, will trial increase in Imdur to 45 mg.  He will follow up and we will consider stress testing  - Continued smoking cessation   - Mediterranean style diet     HTN  - Controlled on Imdur 30 mg for BP and spasm. Increasing to 45 mg for some chest heaviness  - Could use Norvasc if needed in the future      Tobacco dependence  - quit at time of admit      Statin started  - FLP 6/11/18: , HDL 51, LDL 79, TG 52  - Started atorvastatin 40 mg  - Repeat FLP 8/10/18: TC 95, HDL 53, LDL 30, TG 61      Follow up:  Looking for PCP   Dr. Lopez in about late June or July, which is about 1 year our from his stenting    Tye Bran PA-C

## 2019-05-14 ENCOUNTER — TELEPHONE (OUTPATIENT)
Dept: CARDIOLOGY | Facility: CLINIC | Age: 44
End: 2019-05-14

## 2019-05-14 ENCOUNTER — OFFICE VISIT (OUTPATIENT)
Dept: CARDIOLOGY | Facility: CLINIC | Age: 44
End: 2019-05-14
Attending: PHYSICIAN ASSISTANT
Payer: COMMERCIAL

## 2019-05-14 VITALS
SYSTOLIC BLOOD PRESSURE: 110 MMHG | HEART RATE: 72 BPM | HEIGHT: 72 IN | DIASTOLIC BLOOD PRESSURE: 70 MMHG | WEIGHT: 187 LBS | OXYGEN SATURATION: 96 % | BODY MASS INDEX: 25.33 KG/M2

## 2019-05-14 DIAGNOSIS — I25.119 CORONARY ARTERY DISEASE INVOLVING NATIVE CORONARY ARTERY OF NATIVE HEART WITH ANGINA PECTORIS (H): Primary | ICD-10-CM

## 2019-05-14 DIAGNOSIS — I25.10 CORONARY ARTERY DISEASE INVOLVING NATIVE CORONARY ARTERY OF NATIVE HEART WITHOUT ANGINA PECTORIS: ICD-10-CM

## 2019-05-14 PROCEDURE — 99214 OFFICE O/P EST MOD 30 MIN: CPT | Performed by: PHYSICIAN ASSISTANT

## 2019-05-14 ASSESSMENT — MIFFLIN-ST. JEOR: SCORE: 1771.23

## 2019-05-14 NOTE — PROGRESS NOTES
CARDIOLOGY CLINIC PROGRESS NOTE    DOS: 19    Jeremy Lainez  : 1975, 43 year old  MRN: [id number]      History:   I had the pleasure of seeing Jeremy Lainez today in follow up.  He is a patient of Dr. Lopez.     Jeremy Lainez is a very pleasant 43 year old male with history of prior tobacco dependence, recreational marijuana use, CAD, s/p PCI 18, some coronary spasm.     He was admitted on 2018 w/ about 1 week history of intermittent substernal chest pain associated with diaphoresis.  He was sent from PMD office to [facility] ER due to abnormal ECG concerning for anterior T inversions.  Mild troponin elevation.  He was hypertensive. Cardiac catheterization 18 with 60% OM1 lesion and 40-50% p-mLAD lesion, negative FFR (0.84) across the latter.  He had recurrent chest discomfort over the weekend.  Echo 6/10/18 without definitive WMAs.     Repeat cath 18: 60% prox LAD with evidence of plaque rupture.  Acetylcholine provocation study with spasm in the prox LAD lesion without significant spasm elsewhere. Given the results and recurrence while on DAPT and medical therapy, he underwent 3.5 x 28 mm synergy KATLYN to prox LAD.   He had some bradycardia with addition of beta-blocker, so Imdur started.     He saw Dr. Lopez 8/10/18.  He was doing well at that time.  LDL was controlled at 30 on atorvastatin 40 mg.     I saw Anthony 19.  He was doing well without concerns.  BP controlled.     19 pt called noting a chest tightness and pressure.  I saw him the following day.   He had dome well since his stent.  Then about 1 week prior he started to have some chest tightness and pressure.  He described it as discomfort, not pain, which is what he had in 2018.  It is not exertional.  It can last from minutes to hours.  It can occur any time of day.  It does not seem associated with food.  It can be in his central chest, the left chest or even under his left rib cage.  Some days he does not have any  symptoms.   We increased Imdur from 30 mg to 45 mg.       Interval History:   He presents today for follow up.   With the increase in Imdur, chest discomfort episodes are less frequent.   He noticed that they come on when it is hot or humid, for example in a hot car, or he was in [place] and the room had no AC and he felt it coming on.  He took a SL nitroglycerin.  With this and the AC coming on, the feeling went away.  The chest pain is central chest, no radiation to arms or neck.  It is not clearly exertional.  It comes on randomly.   He has not tried GI meds.     He has completely stopped smoking cigarettes.  He has cut down smoking marijuana.  He does vape.    No bleeding on DAPT.   No myalgias.   He has occasional mild headaches.   He is looking for a new PCP.   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 He is looking to increase activity again.       ROS:  Skin:  Positive for bruising   Eyes:  Negative for    ENT:  Negative for    Respiratory:  Negative for     Cardiovascular:    Positive for;fatigue  Gastroenterology: Negative for    Genitourinary:  not assessed    Musculoskeletal:  Negative    Neurologic:  Positive for headaches  Psychiatric:  Negative for    Heme/Lymph/Imm:  Negative    Endocrine:  Negative      PAST MEDICAL HISTORY:  Past Medical History:   Diagnosis Date     CAD (coronary artery disease)      History of tobacco abuse      HTN (hypertension)      Hyperlipidemia      Marijuana use      STEMI (ST elevation myocardial infarction) (H) 06/11/2018     CAD s/p KATLYN to LAD 6/11/18       PAST SURGICAL HISTORY:  Past Surgical History:   Procedure Laterality Date     CARDIAC CATHERIZATION  06/11/2018    KATLYN to LAD       SOCIAL HISTORY:  Social History     Social History     Marital status:      Spouse name: N/A     Number of children: N/A     Years of education: N/A     Social History Main Topics     Smoking status: Former Smoker     Quit date:  6/8/2018     Smokeless tobacco: Never Used     Alcohol use Yes      Comment: 1-2 drinks a night      Drug use: None     Sexual activity: Not Asked     Other Topics Concern     None     Social History Narrative     None       FAMILY HISTORY:  History reviewed. No pertinent family history.    MEDS:   Current Outpatient Medications on File Prior to Visit:  aspirin 81 MG EC tablet Take 1 tablet (81 mg) by mouth daily   atorvastatin (LIPITOR) 40 MG tablet Take 1 tablet (40 mg) by mouth At Bedtime   clopidogrel (PLAVIX) 75 MG tablet Take 1 tablet (75 mg) by mouth daily   isosorbide mononitrate (IMDUR) 30 MG 24 hr tablet Take 1.5 tablets (45 mg) by mouth daily   nitroGLYcerin (NITROSTAT) 0.4 MG sublingual tablet For chest pain place 1 tablet under the tongue every 5 minutes for 3 doses. If symptoms persist 5 minutes after 1st dose call 911.     No current facility-administered medications on file prior to visit.     ALLERGIES: No Known Allergies    PHYSICAL EXAM:  Vitals: /70 (BP Location: Right arm)   Pulse 72   Ht 1.829 m (6')   Wt 84.8 kg (187 lb)   SpO2 96%   BMI 25.36 kg/m    Constitutional:  cooperative, alert and oriented, well developed, well nourished, in no acute distress        Skin:  warm and dry to the touch        Head:  normocephalic, no masses or lesions        Eyes:  pupils equal and round;conjunctivae and lids unremarkable;sclera white;no xanthalasma        ENT:  no pallor or cyanosis, dentition good        Neck:  JVP normal        Respiratory:  normal breath sounds, clear to auscultation, normal A-P diameter, normal symmetry, normal respiratory excursion, no use of accessory muscles        Cardiac: regular rhythm, normal S1/S2, no S3 or S4, apical impulse not displaced, no murmurs, gallops or rubs                  GI:  abdomen soft;BS normoactive;non-tender        Vascular: pulses full and equal                                      Extremities and Musculoskeletal:  no deformities, clubbing,  cyanosis, erythema observed;no edema;no spinal abnormalities noted;normal muscle strength and tone        Neurological:  no gross motor deficits;affect appropriate          DIAGNOSTICS:  ECG 4/25/19:  SR, no ischemic changes    ASSESSMENT/PLAN:  STEMI  CAD  Vasospasm  - anterior ST elevation  - 6/8/18 urgent cath: 40-50% pLAD and 60% OM1 (small vessel)  LAD not found to be flow limiting per FFR (0.84).  No PCI  - developed recurrent CP & palpitations w/ associated ST elevations in V2-V5 w/ T-wave inversions  - ECHO 6/10/18 LVEF 55% possible Mild mid/high lateral, anterior RWMA. Though no definitive WMAs  - Repeat cath 6/11/18: 60% prox LAD. Acetylcholine provocation study with spasm in the prox LAD lesion without significant spasm elsewhere. Given the results and recurrent SINDI while on DAPT and medical therapy he underwent 3.5 x 28 mm synergy KATLYN to prox LAD.   - Has been on ASA, Plavix x 1 year, statin. Also Imdur 30 mg for spasm.   - With some new atypical chest heaviness - atypical in that it is not exertional and that it is not exactly like what he had in June, we increased Imdur to 45 mg.  With this, he thinks episodes are less frequent but still are occurring.   I discussed stress testing with Dr. Lopez.  We will go ahead with stress echo.  Obviously if abnormal, we will consider cath.  But if normal, he will follow up with Dr. Lopez this summer.  - Continued smoking cessation   - Mediterranean style diet     HTN  - Controlled on Imdur 45 mg for BP and spasm.    - Could use Norvasc if needed in the future      Tobacco dependence  - quit at time of admit, though does still use marijuana      Statin started  - FLP 6/11/18: , HDL 51, LDL 79, TG 52  - Started atorvastatin 40 mg  - Repeat FLP 8/10/18: TC 95, HDL 53, LDL 30, TG 61      Follow up:  Looking for PCP   Dr. Lopez in about late June or July, which is about 1 year our from his stenting    Tye Bran PA-C

## 2019-05-14 NOTE — TELEPHONE ENCOUNTER
----- Message from Tye Bran PA-C sent at 5/14/2019  8:03 AM CDT -----  Regarding: After visit  Discussed with Dr. Lopez.   Let's get a stress echo.  I ordered, but he is not aware. Please just let him know.   Then he can be sent to scheduling to get it scheduled.   Also needs to get scheduled with Dr. Lopez this summer.     THANKS  Tye Bran PA-C 5/14/2019 8:03 AM      Called pt and reviewed Tye Bran PA-C recommendations. Pt understood, no questions.   Sent message to scheduling to call pt setup stress echo and follow-up w/ Dr. Lopez.   Tracy MADISON

## 2019-05-20 ENCOUNTER — HOSPITAL ENCOUNTER (OUTPATIENT)
Dept: CARDIOLOGY | Facility: CLINIC | Age: 44
Discharge: HOME | End: 2019-05-20
Attending: PHYSICIAN ASSISTANT | Admitting: PHYSICIAN ASSISTANT
Payer: COMMERCIAL

## 2019-05-20 DIAGNOSIS — I25.119 CORONARY ARTERY DISEASE INVOLVING NATIVE CORONARY ARTERY OF NATIVE HEART WITH ANGINA PECTORIS (H): ICD-10-CM

## 2019-05-20 PROCEDURE — 93350 STRESS TTE ONLY: CPT | Mod: 26 | Performed by: INTERNAL MEDICINE

## 2019-05-20 PROCEDURE — 93321 DOPPLER ECHO F-UP/LMTD STD: CPT | Mod: 26 | Performed by: INTERNAL MEDICINE

## 2019-05-20 PROCEDURE — 93016 CV STRESS TEST SUPVJ ONLY: CPT | Performed by: INTERNAL MEDICINE

## 2019-05-20 PROCEDURE — 93325 DOPPLER ECHO COLOR FLOW MAPG: CPT | Mod: 26 | Performed by: INTERNAL MEDICINE

## 2019-05-20 PROCEDURE — 25500064 ZZH RX 255 OP 636: Performed by: PHYSICIAN ASSISTANT

## 2019-05-20 PROCEDURE — 40000264 ECHO STRESS ECHOCARDIOGRAM

## 2019-05-20 PROCEDURE — 93018 CV STRESS TEST I&R ONLY: CPT | Performed by: INTERNAL MEDICINE

## 2019-05-20 RX ADMIN — HUMAN ALBUMIN MICROSPHERES AND PERFLUTREN 6 ML: 10; .22 INJECTION, SOLUTION INTRAVENOUS at 15:36

## 2019-05-22 DIAGNOSIS — I21.02 ACUTE ST ELEVATION MYOCARDIAL INFARCTION (STEMI) INVOLVING LEFT ANTERIOR DESCENDING (LAD) CORONARY ARTERY (H): ICD-10-CM

## 2019-05-22 RX ORDER — ATORVASTATIN CALCIUM 40 MG/1
40 TABLET, FILM COATED ORAL AT BEDTIME
Qty: 30 TABLET | Refills: 11 | Status: SHIPPED | OUTPATIENT
Start: 2019-05-22 | End: 2019-11-21

## 2019-05-22 NOTE — RESULT ENCOUNTER NOTE
Sent pt [employer] message w/ stress echo results. Advised to follow-up w/  and provided scheduling number.   Tracy MADISON

## 2019-05-22 NOTE — TELEPHONE ENCOUNTER
Medication Refilled: atorvastatin  Last office visit: 19 w/ Tye Bran PA-C   Last Labs/EK/10/18  Next office visit: Due 2019 w/ labs  Pharmacy sent to: ADRIAN Molina RN

## 2019-07-30 DIAGNOSIS — I21.02 ACUTE ST ELEVATION MYOCARDIAL INFARCTION (STEMI) INVOLVING LEFT ANTERIOR DESCENDING (LAD) CORONARY ARTERY (H): ICD-10-CM

## 2019-07-30 NOTE — LETTER
July 30, 2019       TO: Jeremy Lainez  [address] [facility] [address]       Dear Jeremy Lainez,    You have requested a medication refill and our records indicate that you have not been seen by one of our providers for your annual office visit.  We will refill your medication for 3 months, which will allow you enough time to be seen by one of our providers. You are overdue for your annual follow up with Dr. Lopez     Please call our clinic at [phone] to schedule your appointment as soon as possible.    Thank you,    [facility]

## 2019-07-30 NOTE — LETTER
July 30, 2019       TO: Jeremy Lainez  [address]  [facility] [address]       Dear Jeremy Lainez,    You have requested a medication refill and our records indicate that you have not been seen by one of our providers for your annual office visit.  We will refill your medication for 3 months, which will allow you enough time to be seen by one of our providers. You are Due for your 1 year follow up with Dr. Grier.     Please call our clinic at [phone] to schedule your appointment as soon as possible.    Thank you,    [facility]

## 2019-07-30 NOTE — LETTER
July 31, 2019       TO: [address] [facility]aine[facility]  [address] [facility] [address]       Dear Jeremy Lainez,    You have requested a medication refill and our records indicate that you have not been seen by one of our providers for your annual office visit.  We will refill your medication for 3 months, which will allow you enough time to be seen by one of our providers.    Please call our clinic at [phone] to schedule your appointment as soon as possible.    Thank you,    [facility]

## 2019-07-30 NOTE — TELEPHONE ENCOUNTER
Medication Refilled: clopedigrel  Last office visit: 5/14/19*  Last Labs/EKG: NA  Next office visit: follow-up due in July W/Dr. Lopez. Needs to schedule. 1 year s/p stenting. Letter sent to pt. 90 day supply only.   Pharmacy sent to: ADRIAN Long RN

## 2019-07-31 RX ORDER — CLOPIDOGREL BISULFATE 75 MG/1
75 TABLET ORAL DAILY
Qty: 90 TABLET | Refills: 0 | Status: SHIPPED | OUTPATIENT
Start: 2019-07-31 | End: 2019-10-28

## 2019-08-20 DIAGNOSIS — I21.02 ACUTE ST ELEVATION MYOCARDIAL INFARCTION (STEMI) INVOLVING LEFT ANTERIOR DESCENDING (LAD) CORONARY ARTERY (H): ICD-10-CM

## 2019-08-20 RX ORDER — ISOSORBIDE MONONITRATE 30 MG/1
45 TABLET, EXTENDED RELEASE ORAL DAILY
Qty: 90 TABLET | Refills: 1 | Status: SHIPPED | OUTPATIENT
Start: 2019-08-20 | End: 2019-11-21

## 2019-08-20 NOTE — TELEPHONE ENCOUNTER
Medication Refilled: Isosorbide  Last office visit: 19  Last Labs/EK19  Next office visit: 19  Pharmacy sent to: [facility]RI[facility] Long RN

## 2019-10-28 DIAGNOSIS — I21.02 ACUTE ST ELEVATION MYOCARDIAL INFARCTION (STEMI) INVOLVING LEFT ANTERIOR DESCENDING (LAD) CORONARY ARTERY (H): ICD-10-CM

## 2019-10-28 RX ORDER — CLOPIDOGREL BISULFATE 75 MG/1
75 TABLET ORAL DAILY
Qty: 90 TABLET | Refills: 0 | Status: SHIPPED | OUTPATIENT
Start: 2019-10-28 | End: 2019-11-21

## 2019-11-04 ENCOUNTER — HEALTH MAINTENANCE LETTER (OUTPATIENT)
Age: 44
End: 2019-11-04

## 2019-11-20 DIAGNOSIS — I25.10 CORONARY ARTERY DISEASE INVOLVING NATIVE CORONARY ARTERY OF NATIVE HEART WITHOUT ANGINA PECTORIS: ICD-10-CM

## 2019-11-20 DIAGNOSIS — I21.4 NSTEMI (NON-ST ELEVATED MYOCARDIAL INFARCTION) (H): ICD-10-CM

## 2019-11-20 LAB
ANION GAP SERPL CALCULATED.3IONS-SCNC: 10.6 MMOL/L (ref 6–17)
BUN SERPL-MCNC: 12 MG/DL (ref 7–30)
CALCIUM SERPL-MCNC: 9.6 MG/DL (ref 8.5–10.5)
CHLORIDE SERPL-SCNC: 104 MMOL/L (ref 98–107)
CHOLEST SERPL-MCNC: 132 MG/DL
CO2 SERPL-SCNC: 28 MMOL/L (ref 23–29)
CREAT SERPL-MCNC: 1.03 MG/DL (ref 0.7–1.3)
GFR SERPL CREATININE-BSD FRML MDRD: 78 ML/MIN/{1.73_M2}
GLUCOSE SERPL-MCNC: 102 MG/DL (ref 70–105)
HDLC SERPL-MCNC: 53 MG/DL
LDLC SERPL CALC-MCNC: 61 MG/DL
NONHDLC SERPL-MCNC: 79 MG/DL
POTASSIUM SERPL-SCNC: 3.6 MMOL/L (ref 3.5–5.1)
SODIUM SERPL-SCNC: 139 MMOL/L (ref 136–145)
TRIGL SERPL-MCNC: 88 MG/DL

## 2019-11-20 PROCEDURE — 80048 BASIC METABOLIC PNL TOTAL CA: CPT | Performed by: INTERNAL MEDICINE

## 2019-11-20 PROCEDURE — 80061 LIPID PANEL: CPT | Performed by: INTERNAL MEDICINE

## 2019-11-20 PROCEDURE — 36415 COLL VENOUS BLD VENIPUNCTURE: CPT | Performed by: INTERNAL MEDICINE

## 2019-11-21 ENCOUNTER — OFFICE VISIT (OUTPATIENT)
Dept: CARDIOLOGY | Facility: CLINIC | Age: 44
End: 2019-11-21
Payer: COMMERCIAL

## 2019-11-21 VITALS
DIASTOLIC BLOOD PRESSURE: 88 MMHG | HEART RATE: 72 BPM | HEIGHT: 72 IN | WEIGHT: 198.6 LBS | BODY MASS INDEX: 26.9 KG/M2 | SYSTOLIC BLOOD PRESSURE: 128 MMHG

## 2019-11-21 DIAGNOSIS — I21.02 ACUTE ST ELEVATION MYOCARDIAL INFARCTION (STEMI) INVOLVING LEFT ANTERIOR DESCENDING (LAD) CORONARY ARTERY (H): ICD-10-CM

## 2019-11-21 PROCEDURE — 99214 OFFICE O/P EST MOD 30 MIN: CPT | Performed by: INTERNAL MEDICINE

## 2019-11-21 RX ORDER — ATORVASTATIN CALCIUM 40 MG/1
40 TABLET, FILM COATED ORAL AT BEDTIME
Qty: 90 TABLET | Refills: 3 | Status: SHIPPED | OUTPATIENT
Start: 2019-11-21 | End: 2020-11-18

## 2019-11-21 RX ORDER — ISOSORBIDE MONONITRATE 30 MG/1
30 TABLET, EXTENDED RELEASE ORAL DAILY
Qty: 90 TABLET | Refills: 1 | COMMUNITY
Start: 2019-11-21 | End: 2020-09-24

## 2019-11-21 ASSESSMENT — MIFFLIN-ST. JEOR: SCORE: 1823.84

## 2019-11-21 NOTE — LETTER
11/21/2019    Physician No Ref-Primary  No address on file    RE: Jeremy Lainez       Dear Colleague,    I had the pleasure of seeing Jeremy Lainez in the [facility].    Cardiology Progress Note          Assessment and Plan:     1. Coronary artery disease status post PCI of the LAD 6/2018    Currently asymptomatic.  Negative stress test May 2019.    May discontinue the clopidogrel and remain on the aspirin.      Would like him to discontinue marijuana and vaping as well.    Routine follow-up in 1 year with Tye Bran      This note was transcribed using electronic voice recognition software and there may be typographical errors present.                Interval History:   The patient is a very pleasant 43 year old whom I have been following for coronary artery disease status post PCI of the LAD 6/11/2018.  He has not smoked cigarettes since that time.  He occasionally has recreational marijuana use.  He has some questions about his current medications and about the pathophysiology of coronary artery disease as well as his future prognosis.  His exercise tolerance has been pretty good.  He had negative stress test 5/20/2019 that was prompted by some intermittent atypical chest discomfort.  This has settled down and he has decreased from Imdur 45 mg down to 30 mg which has been good enough to prevent recurrent symptoms.                     Review of Systems:   Review of Systems:  Skin:  Positive for bruising   Eyes:  Negative    ENT:  Negative    Respiratory:  Negative    Cardiovascular:  Negative    Gastroenterology: Negative    Genitourinary:  Negative    Musculoskeletal:  Negative    Neurologic:  Negative    Psychiatric:  Negative    Heme/Lymph/Imm:  Negative    Endocrine:  Negative                Physical Exam:     Vitals: /88 (BP Location: Right arm, Patient Position: Sitting, Cuff Size: Adult Regular)   Pulse 72   Ht 1.829 m (6')   Wt 90.1 kg (198 lb 9.6 oz)   BMI  26.94 kg/m     Constitutional:  cooperative, alert and oriented, well developed, well nourished, in no acute distress        Skin:  warm and dry to the touch        Head:  normocephalic, no masses or lesions        Eyes:  pupils equal and round;conjunctivae and lids unremarkable;sclera white;no xanthalasma        ENT:  no pallor or cyanosis, dentition good        Neck:  JVP normal        Chest:  normal breath sounds, clear to auscultation, normal A-P diameter, normal symmetry, normal respiratory excursion, no use of accessory muscles        Cardiac: regular rhythm;no murmurs, gallops or rubs detected                  Abdomen:  BS normoactive        Extremities and Back:  no deformities, clubbing, cyanosis, erythema observed        Neurological:  no gross motor deficits;affect appropriate                 Medications:     Current Outpatient Medications   Medication Sig Dispense Refill     aspirin 81 MG EC tablet Take 1 tablet (81 mg) by mouth daily       atorvastatin (LIPITOR) 40 MG tablet Take 1 tablet (40 mg) by mouth At Bedtime 90 tablet 3     isosorbide mononitrate (IMDUR) 30 MG 24 hr tablet Take 1 tablet (30 mg) by mouth daily 90 tablet 1     nitroGLYcerin (NITROSTAT) 0.4 MG sublingual tablet For chest pain place 1 tablet under the tongue every 5 minutes for 3 doses. If symptoms persist 5 minutes after 1st dose call 911. 25 tablet 3                Data:   All laboratory data reviewed  No results found for this or any previous visit (from the past 24 hour(s)).    All laboratory data reviewed  Lab Results   Component Value Date    CHOL 132 11/20/2019     Lab Results   Component Value Date    HDL 53 11/20/2019     Lab Results   Component Value Date    LDL 61 11/20/2019     Lab Results   Component Value Date    TRIG 88 11/20/2019     No results found for: CHOLHDLRATIO  No results found for: TSH  Last Basic Metabolic Panel:  Lab Results   Component Value Date     11/20/2019      Lab Results   Component Value  Date    POTASSIUM 3.6 11/20/2019     Lab Results   Component Value Date    CHLORIDE 104 11/20/2019     Lab Results   Component Value Date    MARIZA 9.6 11/20/2019     Lab Results   Component Value Date    CO2 28 11/20/2019     Lab Results   Component Value Date    BUN 12 11/20/2019     Lab Results   Component Value Date    CR 1.03 11/20/2019     Lab Results   Component Value Date     11/20/2019     Lab Results   Component Value Date    WBC 8.4 06/12/2018     Lab Results   Component Value Date    RBC 4.91 06/12/2018     Lab Results   Component Value Date    HGB 15.8 06/12/2018     Lab Results   Component Value Date    HCT 45.0 06/12/2018     Lab Results   Component Value Date    MCV 92 06/12/2018     Lab Results   Component Value Date    MCH 32.2 06/12/2018     Lab Results   Component Value Date    MCHC 35.1 06/12/2018     Lab Results   Component Value Date    RDW 12.1 06/12/2018     Lab Results   Component Value Date     06/12/2018                 Thank you for allowing me to participate in the care of your patient.      Sincerely,     Ravindra Lopez MD     [facility]    cc:   Physician No Ref-Primary  No address on file

## 2019-11-21 NOTE — PROGRESS NOTES
Cardiology Progress Note          Assessment and Plan:     1. Coronary artery disease status post PCI of the LAD 6/2018    Currently asymptomatic.  Negative stress test May 2019.    May discontinue the clopidogrel and remain on the aspirin.      Would like him to discontinue marijuana and vaping as well.    Routine follow-up in 1 year with Tye Bran      This note was transcribed using electronic voice recognition software and there may be typographical errors present.                Interval History:   The patient is a very pleasant 43 year old whom I have been following for coronary artery disease status post PCI of the LAD 6/11/2018.  He has not smoked cigarettes since that time.  He occasionally has recreational marijuana use.  He has some questions about his current medications and about the pathophysiology of coronary artery disease as well as his future prognosis.  His exercise tolerance has been pretty good.  He had negative stress test 5/20/2019 that was prompted by some intermittent atypical chest discomfort.  This has settled down and he has decreased from Imdur 45 mg down to 30 mg which has been good enough to prevent recurrent symptoms.                     Review of Systems:   Review of Systems:  Skin:  Positive for bruising   Eyes:  Negative    ENT:  Negative    Respiratory:  Negative    Cardiovascular:  Negative    Gastroenterology: Negative    Genitourinary:  Negative    Musculoskeletal:  Negative    Neurologic:  Negative    Psychiatric:  Negative    Heme/Lymph/Imm:  Negative    Endocrine:  Negative                Physical Exam:     Vitals: /88 (BP Location: Right arm, Patient Position: Sitting, Cuff Size: Adult Regular)   Pulse 72   Ht 1.829 m (6')   Wt 90.1 kg (198 lb 9.6 oz)   BMI 26.94 kg/m    Constitutional:  cooperative, alert and oriented, well developed, well nourished, in no acute distress        Skin:  warm and dry to the touch        Head:  normocephalic, no masses or lesions         Eyes:  pupils equal and round;conjunctivae and lids unremarkable;sclera white;no xanthalasma        ENT:  no pallor or cyanosis, dentition good        Neck:  JVP normal        Chest:  normal breath sounds, clear to auscultation, normal A-P diameter, normal symmetry, normal respiratory excursion, no use of accessory muscles        Cardiac: regular rhythm;no murmurs, gallops or rubs detected                  Abdomen:  BS normoactive        Extremities and Back:  no deformities, clubbing, cyanosis, erythema observed        Neurological:  no gross motor deficits;affect appropriate                 Medications:     Current Outpatient Medications   Medication Sig Dispense Refill     aspirin 81 MG EC tablet Take 1 tablet (81 mg) by mouth daily       atorvastatin (LIPITOR) 40 MG tablet Take 1 tablet (40 mg) by mouth At Bedtime 90 tablet 3     isosorbide mononitrate (IMDUR) 30 MG 24 hr tablet Take 1 tablet (30 mg) by mouth daily 90 tablet 1     nitroGLYcerin (NITROSTAT) 0.4 MG sublingual tablet For chest pain place 1 tablet under the tongue every 5 minutes for 3 doses. If symptoms persist 5 minutes after 1st dose call 911. 25 tablet 3                Data:   All laboratory data reviewed  No results found for this or any previous visit (from the past 24 hour(s)).    All laboratory data reviewed  Lab Results   Component Value Date    CHOL 132 11/20/2019     Lab Results   Component Value Date    HDL 53 11/20/2019     Lab Results   Component Value Date    LDL 61 11/20/2019     Lab Results   Component Value Date    TRIG 88 11/20/2019     No results found for: CHOLHDLRATIO  No results found for: TSH  Last Basic Metabolic Panel:  Lab Results   Component Value Date     11/20/2019      Lab Results   Component Value Date    POTASSIUM 3.6 11/20/2019     Lab Results   Component Value Date    CHLORIDE 104 11/20/2019     Lab Results   Component Value Date    MARIZA 9.6 11/20/2019     Lab Results   Component Value Date    CO2 28  11/20/2019     Lab Results   Component Value Date    BUN 12 11/20/2019     Lab Results   Component Value Date    CR 1.03 11/20/2019     Lab Results   Component Value Date     11/20/2019     Lab Results   Component Value Date    WBC 8.4 06/12/2018     Lab Results   Component Value Date    RBC 4.91 06/12/2018     Lab Results   Component Value Date    HGB 15.8 06/12/2018     Lab Results   Component Value Date    HCT 45.0 06/12/2018     Lab Results   Component Value Date    MCV 92 06/12/2018     Lab Results   Component Value Date    MCH 32.2 06/12/2018     Lab Results   Component Value Date    MCHC 35.1 06/12/2018     Lab Results   Component Value Date    RDW 12.1 06/12/2018     Lab Results   Component Value Date     06/12/2018

## 2020-09-24 DIAGNOSIS — I21.02 ACUTE ST ELEVATION MYOCARDIAL INFARCTION (STEMI) INVOLVING LEFT ANTERIOR DESCENDING (LAD) CORONARY ARTERY (H): ICD-10-CM

## 2020-09-24 RX ORDER — ISOSORBIDE MONONITRATE 30 MG/1
30 TABLET, EXTENDED RELEASE ORAL DAILY
Qty: 90 TABLET | Refills: 0 | Status: SHIPPED | OUTPATIENT
Start: 2020-09-24 | End: 2020-11-18

## 2020-09-24 NOTE — TELEPHONE ENCOUNTER
Received refill request for:  Isosorbide mononitrate  Last OV was: 11/21/2019 with Dr. Lopez  Labs/EKG: n/a  F/U scheduled: 11/18/2020 with ALIX Rivera  New script sent to: [facility]

## 2020-11-18 ENCOUNTER — OFFICE VISIT (OUTPATIENT)
Dept: CARDIOLOGY | Facility: CLINIC | Age: 45
End: 2020-11-18
Attending: INTERNAL MEDICINE
Payer: COMMERCIAL

## 2020-11-18 VITALS
OXYGEN SATURATION: 99 % | HEART RATE: 86 BPM | DIASTOLIC BLOOD PRESSURE: 78 MMHG | SYSTOLIC BLOOD PRESSURE: 112 MMHG | BODY MASS INDEX: 26.57 KG/M2 | WEIGHT: 195.9 LBS

## 2020-11-18 DIAGNOSIS — I21.02 ACUTE ST ELEVATION MYOCARDIAL INFARCTION (STEMI) INVOLVING LEFT ANTERIOR DESCENDING (LAD) CORONARY ARTERY (H): ICD-10-CM

## 2020-11-18 DIAGNOSIS — I21.4 NSTEMI (NON-ST ELEVATED MYOCARDIAL INFARCTION) (H): ICD-10-CM

## 2020-11-18 PROCEDURE — 99214 OFFICE O/P EST MOD 30 MIN: CPT | Performed by: PHYSICIAN ASSISTANT

## 2020-11-18 RX ORDER — ISOSORBIDE MONONITRATE 30 MG/1
30 TABLET, EXTENDED RELEASE ORAL DAILY
Qty: 90 TABLET | Refills: 3 | Status: SHIPPED | OUTPATIENT
Start: 2020-11-18 | End: 2020-11-18

## 2020-11-18 RX ORDER — NITROGLYCERIN 0.4 MG/1
TABLET SUBLINGUAL
Qty: 25 TABLET | Refills: 3 | Status: SHIPPED | OUTPATIENT
Start: 2020-11-18 | End: 2020-11-18

## 2020-11-18 RX ORDER — ATORVASTATIN CALCIUM 40 MG/1
40 TABLET, FILM COATED ORAL AT BEDTIME
Qty: 90 TABLET | Refills: 3 | Status: SHIPPED | OUTPATIENT
Start: 2020-11-18 | End: 2020-11-18

## 2020-11-18 NOTE — PROGRESS NOTES
CARDIOLOGY CLINIC PROGRESS NOTE    DOS: 2020      Jeremy Lainez  : 1975, 44 year old  MRN: [id number]      History:  I had the pleasure of seeing Jeremy Lainez today in follow up.  He is a patient of Dr. Lopez.      Jeremy Lainez is a very pleasant 44 year old male with history of prior tobacco dependence, recreational marijuana use, CAD, s/p PCI 18, some coronary spasm.      He was admitted on 2018 with about 1 week history of intermittent substernal chest pain associated with diaphoresis.  He was sent from PMD office to [facility] ER due to abnormal ECG concerning for anterior T inversions.  Mild troponin elevation.  He was hypertensive. Cardiac catheterization 18 with 60% OM1 lesion and 40-50% p-mLAD lesion, negative FFR (0.84) across the latter.  He had recurrent chest discomfort over the weekend.  Echo 6/10/18 without definitive WMAs.     Repeat cath 18: 60% prox LAD with evidence of plaque rupture.  Acetylcholine provocation study with spasm in the prox LAD lesion without significant spasm elsewhere. Given the results and recurrence while on DAPT and medical therapy, he underwent 3.5 x 28 mm synergy KATLYN to prox LAD.   He had some bradycardia with addition of beta-blocker, so Imdur started.   Due to some sxs in follow up, Imdur was increased to 45 mg.        Interval History:   19 last seen by Dr. Lopez.  Doing well.  Dr. Lopez said he could stop Plavix.      Anthony presents today for follow up.   He remains off cigarettes.  He has cut down smoking marijuana and vaping, none the past 2-3 months.     No myalgias.   No exertional chest discomfort.  Does have an occasional twinge. Has not needed SL nitroglycerin.   He has not been as active as he had been before - no sports or organized activities, but he has been hiking with his family.          ROS:  Skin:  Negative     Eyes:  Negative    ENT:  Negative sinus trouble  Respiratory:  Negative    Cardiovascular:    Positive  for;fatigue  Gastroenterology: Negative    Genitourinary:  Negative    Musculoskeletal:  Negative    Neurologic:  Positive for headaches  Psychiatric:  Positive for sleep disturbances  Heme/Lymph/Imm:  Negative    Endocrine:  Negative      PAST MEDICAL HISTORY:  Past Medical History:   Diagnosis Date     CAD (coronary artery disease)      History of tobacco abuse      HTN (hypertension)      Hyperlipidemia      Marijuana use      STEMI (ST elevation myocardial infarction) (H) 2018     CAD s/p KATLYN to LAD 18       PAST SURGICAL HISTORY:  Past Surgical History:   Procedure Laterality Date     CARDIAC CATHERIZATION  2018    KATLYN to LAD       SOCIAL HISTORY:  Social History     Socioeconomic History     Marital status:      Spouse name: Not on file     Number of children: Not on file     Years of education: Not on file     Highest education level: Not on file   Occupational History     Not on file   Social Needs     Financial resource strain: Not on file     Food insecurity     Worry: Not on file     Inability: Not on file     Transportation needs     Medical: Not on file     Non-medical: Not on file   Tobacco Use     Smoking status: Former Smoker     Quit date: 2018     Years since quittin.4     Smokeless tobacco: Never Used   Substance and Sexual Activity     Alcohol use: Yes     Comment: 1-2 drinks a night      Drug use: Not on file     Sexual activity: Not on file   Lifestyle     Physical activity     Days per week: Not on file     Minutes per session: Not on file     Stress: Not on file   Relationships     Social connections     Talks on phone: Not on file     Gets together: Not on file     Attends Mandaeism service: Not on file     Active member of club or organization: Not on file     Attends meetings of clubs or organizations: Not on file     Relationship status: Not on file     Intimate partner violence     Fear of current or ex partner: Not on file     Emotionally abused: Not on  file     Physically abused: Not on file     Forced sexual activity: Not on file   Other Topics Concern     Not on file   Social History Narrative     Not on file       FAMILY HISTORY:  No family history on file.    MEDS:      aspirin 81 MG EC tablet, Take 1 tablet (81 mg) by mouth daily    No current facility-administered medications on file prior to visit.       ALLERGIES: No Known Allergies    PHYSICAL EXAM:  Vitals: /78 (BP Location: Right arm, Patient Position: Chair, Cuff Size: Adult Regular)   Pulse 86   Wt 88.9 kg (195 lb 14.4 oz)   SpO2 99%   BMI 26.57 kg/m    Constitutional:  cooperative, alert and oriented, well developed, well nourished, in no acute distress        Skin:  warm and dry to the touch        Head:  normocephalic, no masses or lesions        Eyes:  pupils equal and round;conjunctivae and lids unremarkable;sclera white;no xanthalasma        ENT:  no pallor or cyanosis        Neck:  JVP normal;no carotid bruit        Respiratory:  normal breath sounds, clear to auscultation, normal A-P diameter, normal symmetry, normal respiratory excursion, no use of accessory muscles        Cardiac: regular rhythm;no murmurs, gallops or rubs detected;normal S1 and S2                  GI:  BS normoactive        Vascular:                                        Extremities and Musculoskeletal:  no deformities, clubbing, cyanosis, erythema observed;no edema;no spinal abnormalities noted;normal muscle strength and tone        Neurological:  no gross motor deficits;affect appropriate            LABS/DATA:  I reviewed the following:  No new data      ASSESSMENT/PLAN:  STEMI  CAD  Vasospasm  - anterior ST elevation  - 6/8/18 urgent cath: 40-50% pLAD and 60% OM1 (small vessel)  LAD not found to be flow limiting per FFR (0.84).  No PCI  - developed recurrent CP & palpitations w/ associated ST elevations in V2-V5 w/ T-wave inversions  - ECHO 6/10/18 LVEF 55% possible Mild mid/high lateral, anterior RWMA. Though  no definitive WMAs  - Repeat cath 6/11/18: 60% prox LAD. Acetylcholine provocation study with spasm in the prox LAD lesion without significant spasm elsewhere. Given the results and recurrent SINDI while on DAPT and medical therapy he underwent 3.5 x 28 mm synergy KATLYN to prox LAD.   - Continue ASA, Imdur 45 mg for spasm, atorvastatin 40 mg   - Continued smoking cessation   - Mediterranean style diet     HTN  - Controlled on Imdur 45 mg for BP and spasm.    - Could use Norvasc if needed in the future      Tobacco dependence  - quit at time of admit, though does still use marijuana      Statin started  - FLP 6/11/18: , HDL 51, LDL 79, TG 52  - Started atorvastatin 40 mg  - Repeat FLP 8/10/18: TC 95, HDL 53, LDL 30, TG 61        Follow up:  1 year with FLP/ALT        Tye Bran PA-C

## 2020-11-18 NOTE — LETTER
2020    Physician No Ref-Primary  No address on file    RE: Jeremy Lainez       Dear Colleague,    I had the pleasure of seeing Jeremy Lainez in the [facility].    CARDIOLOGY CLINIC PROGRESS NOTE    DOS: 2020      Jeremy Lainez  : 1975, 44 year old  MRN: [id number]      History:  I had the pleasure of seeing Jeremy Lainez today in follow up.  He is a patient of Dr. Lopez.      Jeremy Lainez is a very pleasant 44 year old male with history of prior tobacco dependence, recreational marijuana use, CAD, s/p PCI 18, some coronary spasm.      He was admitted on 2018 with about 1 week history of intermittent substernal chest pain associated with diaphoresis.  He was sent from PMD office to [facility] ER due to abnormal ECG concerning for anterior T inversions.  Mild troponin elevation.  He was hypertensive. Cardiac catheterization 18 with 60% OM1 lesion and 40-50% p-mLAD lesion, negative FFR (0.84) across the latter.  He had recurrent chest discomfort over the weekend.  Echo 6/10/18 without definitive WMAs.     Repeat cath 18: 60% prox LAD with evidence of plaque rupture.  Acetylcholine provocation study with spasm in the prox LAD lesion without significant spasm elsewhere. Given the results and recurrence while on DAPT and medical therapy, he underwent 3.5 x 28 mm synergy KATLYN to prox LAD.   He had some bradycardia with addition of beta-blocker, so Imdur started.   Due to some sxs in follow up, Imdur was increased to 45 mg.        Interval History:   19 last seen by Dr. Lopez.  Doing well.  Dr. Lopez said he could stop Plavix.      Anthony presents today for follow up.   He remains off cigarettes.  He has cut down smoking marijuana and vaping, none the past 2-3 months.     No myalgias.   No exertional chest discomfort.  Does have an occasional twinge. Has not needed SL nitroglycerin.   He has not been as active as he had been before - no sports or  organized activities, but he has been hiking with his family.          ROS:  Skin:  Negative     Eyes:  Negative    ENT:  Negative sinus trouble  Respiratory:  Negative    Cardiovascular:    Positive for;fatigue  Gastroenterology: Negative    Genitourinary:  Negative    Musculoskeletal:  Negative    Neurologic:  Positive for headaches  Psychiatric:  Positive for sleep disturbances  Heme/Lymph/Imm:  Negative    Endocrine:  Negative      PAST MEDICAL HISTORY:  Past Medical History:   Diagnosis Date     CAD (coronary artery disease)      History of tobacco abuse      HTN (hypertension)      Hyperlipidemia      Marijuana use      STEMI (ST elevation myocardial infarction) (H) 2018     CAD s/p KATLYN to LAD 18       PAST SURGICAL HISTORY:  Past Surgical History:   Procedure Laterality Date     CARDIAC CATHERIZATION  2018    KATLYN to LAD       SOCIAL HISTORY:  Social History     Socioeconomic History     Marital status:      Spouse name: Not on file     Number of children: Not on file     Years of education: Not on file     Highest education level: Not on file   Occupational History     Not on file   Social Needs     Financial resource strain: Not on file     Food insecurity     Worry: Not on file     Inability: Not on file     Transportation needs     Medical: Not on file     Non-medical: Not on file   Tobacco Use     Smoking status: Former Smoker     Quit date: 2018     Years since quittin.4     Smokeless tobacco: Never Used   Substance and Sexual Activity     Alcohol use: Yes     Comment: 1-2 drinks a night      Drug use: Not on file     Sexual activity: Not on file   Lifestyle     Physical activity     Days per week: Not on file     Minutes per session: Not on file     Stress: Not on file   Relationships     Social connections     Talks on phone: Not on file     Gets together: Not on file     Attends [employer] service: Not on file     Active member of club or organization: Not on file      Attends meetings of clubs or organizations: Not on file     Relationship status: Not on file     Intimate partner violence     Fear of current or ex partner: Not on file     Emotionally abused: Not on file     Physically abused: Not on file     Forced sexual activity: Not on file   Other Topics Concern     Not on file   Social History Narrative     Not on file       FAMILY HISTORY:  No family history on file.    MEDS:      aspirin 81 MG EC tablet, Take 1 tablet (81 mg) by mouth daily    No current facility-administered medications on file prior to visit.       ALLERGIES: No Known Allergies    PHYSICAL EXAM:  Vitals: /78 (BP Location: Right arm, Patient Position: Chair, Cuff Size: Adult Regular)   Pulse 86   Wt 88.9 kg (195 lb 14.4 oz)   SpO2 99%   BMI 26.57 kg/m    Constitutional:  cooperative, alert and oriented, well developed, well nourished, in no acute distress        Skin:  warm and dry to the touch        Head:  normocephalic, no masses or lesions        Eyes:  pupils equal and round;conjunctivae and lids unremarkable;sclera white;no xanthalasma        ENT:  no pallor or cyanosis        Neck:  JVP normal;no carotid bruit        Respiratory:  normal breath sounds, clear to auscultation, normal A-P diameter, normal symmetry, normal respiratory excursion, no use of accessory muscles        Cardiac: regular rhythm;no murmurs, gallops or rubs detected;normal S1 and S2                  GI:  BS normoactive        Vascular:                                        Extremities and Musculoskeletal:  no deformities, clubbing, cyanosis, erythema observed;no edema;no spinal abnormalities noted;normal muscle strength and tone        Neurological:  no gross motor deficits;affect appropriate            LABS/DATA:  I reviewed the following:  No new data      ASSESSMENT/PLAN:  STEMI  CAD  Vasospasm  - anterior ST elevation  - 6/8/18 urgent cath: 40-50% pLAD and 60% OM1 (small vessel)  LAD not found to be flow limiting  per FFR (0.84).  No PCI  - developed recurrent CP & palpitations w/ associated ST elevations in V2-V5 w/ T-wave inversions  - ECHO 6/10/18 LVEF 55% possible Mild mid/high lateral, anterior RWMA. Though no definitive WMAs  - Repeat cath 6/11/18: 60% prox LAD. Acetylcholine provocation study with spasm in the prox LAD lesion without significant spasm elsewhere. Given the results and recurrent ISNDI while on DAPT and medical therapy he underwent 3.5 x 28 mm synergy KATLYN to prox LAD.   - Continue ASA, Imdur 45 mg for spasm, atorvastatin 40 mg   - Continued smoking cessation   - Mediterranean style diet     HTN  - Controlled on Imdur 45 mg for BP and spasm.    - Could use Norvasc if needed in the future      Tobacco dependence  - quit at time of admit, though does still use marijuana      Statin started  - FLP 6/11/18: , HDL 51, LDL 79, TG 52  - Started atorvastatin 40 mg  - Repeat FLP 8/10/18: TC 95, HDL 53, LDL 30, TG 61        Follow up:  1 year with FLP/ALT        Tye Bran PA-C      Thank you for allowing me to participate in the care of your patient.      Sincerely,     Tye Bran PA-C     [facility]    cc:   Ravindra Lopez MD  [address]  [place]  [place] [address]

## 2020-11-22 ENCOUNTER — HEALTH MAINTENANCE LETTER (OUTPATIENT)
Age: 45
End: 2020-11-22

## 2021-09-18 ENCOUNTER — HEALTH MAINTENANCE LETTER (OUTPATIENT)
Age: 46
End: 2021-09-18

## 2021-12-25 ENCOUNTER — APPOINTMENT (OUTPATIENT)
Dept: GENERAL RADIOLOGY | Facility: CLINIC | Age: 46
End: 2021-12-25
Attending: EMERGENCY MEDICINE
Payer: COMMERCIAL

## 2021-12-25 ENCOUNTER — HOSPITAL ENCOUNTER (EMERGENCY)
Facility: CLINIC | Age: 46
Discharge: HOME | End: 2021-12-25
Attending: EMERGENCY MEDICINE | Admitting: EMERGENCY MEDICINE
Payer: COMMERCIAL

## 2021-12-25 VITALS
DIASTOLIC BLOOD PRESSURE: 98 MMHG | HEART RATE: 64 BPM | OXYGEN SATURATION: 96 % | TEMPERATURE: 98.6 F | HEIGHT: 74 IN | SYSTOLIC BLOOD PRESSURE: 118 MMHG | RESPIRATION RATE: 11 BRPM | BODY MASS INDEX: 25.15 KG/M2

## 2021-12-25 DIAGNOSIS — R07.89 CHEST PRESSURE: ICD-10-CM

## 2021-12-25 LAB
ANION GAP SERPL CALCULATED.3IONS-SCNC: 6 MMOL/L (ref 3–14)
BASOPHILS # BLD AUTO: 0 10E3/UL (ref 0–0.2)
BASOPHILS NFR BLD AUTO: 1 %
BUN SERPL-MCNC: 15 MG/DL (ref 7–30)
CALCIUM SERPL-MCNC: 8.6 MG/DL (ref 8.5–10.1)
CHLORIDE BLD-SCNC: 105 MMOL/L (ref 94–109)
CO2 SERPL-SCNC: 29 MMOL/L (ref 20–32)
CREAT SERPL-MCNC: 0.9 MG/DL (ref 0.66–1.25)
EOSINOPHIL # BLD AUTO: 0.1 10E3/UL (ref 0–0.7)
EOSINOPHIL NFR BLD AUTO: 1 %
ERYTHROCYTE [DISTWIDTH] IN BLOOD BY AUTOMATED COUNT: 12 % (ref 10–15)
GFR SERPL CREATININE-BSD FRML MDRD: >90 ML/MIN/1.73M2
GLUCOSE BLD-MCNC: 121 MG/DL (ref 70–99)
HCT VFR BLD AUTO: 43 % (ref 40–53)
HGB BLD-MCNC: 14.5 G/DL (ref 13.3–17.7)
HOLD SPECIMEN: NORMAL
IMM GRANULOCYTES # BLD: 0 10E3/UL
IMM GRANULOCYTES NFR BLD: 0 %
LYMPHOCYTES # BLD AUTO: 1.6 10E3/UL (ref 0.8–5.3)
LYMPHOCYTES NFR BLD AUTO: 27 %
MCH RBC QN AUTO: 30.9 PG (ref 26.5–33)
MCHC RBC AUTO-ENTMCNC: 33.7 G/DL (ref 31.5–36.5)
MCV RBC AUTO: 92 FL (ref 78–100)
MONOCYTES # BLD AUTO: 0.5 10E3/UL (ref 0–1.3)
MONOCYTES NFR BLD AUTO: 8 %
NEUTROPHILS # BLD AUTO: 3.8 10E3/UL (ref 1.6–8.3)
NEUTROPHILS NFR BLD AUTO: 63 %
NRBC # BLD AUTO: 0 10E3/UL
NRBC BLD AUTO-RTO: 0 /100
PLATELET # BLD AUTO: 243 10E3/UL (ref 150–450)
POTASSIUM BLD-SCNC: 3.5 MMOL/L (ref 3.4–5.3)
RBC # BLD AUTO: 4.69 10E6/UL (ref 4.4–5.9)
SODIUM SERPL-SCNC: 140 MMOL/L (ref 133–144)
TROPONIN I SERPL HS-MCNC: 7 NG/L
TROPONIN I SERPL HS-MCNC: 8 NG/L
WBC # BLD AUTO: 6 10E3/UL (ref 4–11)

## 2021-12-25 PROCEDURE — 99285 EMERGENCY DEPT VISIT HI MDM: CPT | Mod: 25

## 2021-12-25 PROCEDURE — 250N000013 HC RX MED GY IP 250 OP 250 PS 637: Performed by: EMERGENCY MEDICINE

## 2021-12-25 PROCEDURE — 93005 ELECTROCARDIOGRAM TRACING: CPT

## 2021-12-25 PROCEDURE — 71046 X-RAY EXAM CHEST 2 VIEWS: CPT

## 2021-12-25 PROCEDURE — 84484 ASSAY OF TROPONIN QUANT: CPT | Mod: 91 | Performed by: EMERGENCY MEDICINE

## 2021-12-25 PROCEDURE — 85025 COMPLETE CBC W/AUTO DIFF WBC: CPT | Performed by: EMERGENCY MEDICINE

## 2021-12-25 PROCEDURE — 84484 ASSAY OF TROPONIN QUANT: CPT | Performed by: EMERGENCY MEDICINE

## 2021-12-25 PROCEDURE — 36415 COLL VENOUS BLD VENIPUNCTURE: CPT | Performed by: EMERGENCY MEDICINE

## 2021-12-25 PROCEDURE — 80048 BASIC METABOLIC PNL TOTAL CA: CPT | Performed by: EMERGENCY MEDICINE

## 2021-12-25 RX ORDER — ASPIRIN 81 MG/1
162 TABLET, CHEWABLE ORAL ONCE
Status: COMPLETED | OUTPATIENT
Start: 2021-12-25 | End: 2021-12-25

## 2021-12-25 RX ADMIN — ASPIRIN 81 MG CHEWABLE TABLET 162 MG: 81 TABLET CHEWABLE at 19:23

## 2021-12-25 ASSESSMENT — ENCOUNTER SYMPTOMS
COUGH: 0
CHEST TIGHTNESS: 1
FEVER: 0
ABDOMINAL PAIN: 0
PALPITATIONS: 0
SHORTNESS OF BREATH: 0

## 2021-12-25 NOTE — ED TRIAGE NOTES
Pt presents to the ER with c/o chest pressure for the past couple days. Hx of MI and stents in June 2018.

## 2021-12-25 NOTE — ED PROVIDER NOTES
"  History   Chief Complaint:  Chest pressure    The history is provided by the patient.      Jeremy Lainez is a 45 year old male former smoker with history of hypertension, CAD, and STEMI with stent placements in 2018 who presents with chest pressure. The patient reports a few days of intermittent chest pressure that varies in duration but usually lasts minutes to hours. He estimates about five episodes per day. Nitroglycerin has helped somewhat with symptom relief, but he denies any other exacerbating or alleviating factors.  Discomfort is not increased with exertion and is not painful or similar to previous MI symptoms.  No leg swelling, shortness of breath, cough, or fevers. He does not have the pressure currently. He is fully vaccinated against COVID.     Review of Systems   Constitutional: Negative for fever.   Respiratory: Positive for chest tightness. Negative for cough and shortness of breath.    Cardiovascular: Negative for chest pain, palpitations and leg swelling.   Gastrointestinal: Negative for abdominal pain.   All other systems reviewed and are negative.      Allergies:  No Known Allergies    Medications:  Aspirin  Lipitor  Imdur    Past Medical History:     CAD  Tobacco abuse  Hypertension  Hyperlipidemia  STEMI    Past Surgical History:    Right ACL repair  Cardiac catheterization with stent placement    Family History:    Noncontributory     Social History:  Presents to ED alone.  Former smoker, quit in 2018.    Physical Exam     Patient Vitals for the past 24 hrs:   BP Temp Temp src Pulse Resp SpO2 Height   12/25/21 2100 (!) 118/98 -- -- 64 11 96 % --   12/25/21 2000 (!) 141/97 -- -- 67 14 95 % --   12/25/21 1900 -- -- -- 75 24 98 % --   12/25/21 1801 (!) 146/104 98.6  F (37  C) Oral 81 18 97 % 1.88 m (6' 2\")       Physical Exam  General: Alert and cooperative with exam. Patient in mild distress. Normal mentation.  Head:  Scalp is NC/AT  Eyes:  No scleral icterus, PERRL  ENT:  The external nose " and ears are normal.   Neck:  Normal range of motion without rigidity.  CV:  Regular rate and rhythm    No pathologic murmur   Resp:  Breath sounds are clear bilaterally    Non-labored, no retractions or accessory muscle use  GI:  Abdomen is soft, no distension, no tenderness. No peritoneal signs  MS:  No lower extremity edema   Skin:  Warm and dry, No rash or lesions noted.  Neuro: Oriented x 3. No gross motor deficits.     Emergency Department Course   ECG  ECG obtained at 1758, ECG read at 1800  Normal sinus rhythm  Normal ECG   No significant change as compared to prior, dated 05/25/2019.  Rate 88 bpm. IN interval 150 ms. QRS duration 88 ms. QT/QTc 358/433 ms. P-R-T axes 65 0 38.     Imaging:  XR Chest 2 Views   Final Result   IMPRESSION: Negative chest.      Exercise Stress Echocardiogram    (Results Pending)     Report per radiology    Laboratory:  Labs Ordered and Resulted from Time of ED Arrival to Time of ED Departure   BASIC METABOLIC PANEL - Abnormal       Result Value    Sodium 140      Potassium 3.5      Chloride 105      Carbon Dioxide (CO2) 29      Anion Gap 6      Urea Nitrogen 15      Creatinine 0.90      Calcium 8.6      Glucose 121 (*)     GFR Estimate >90     TROPONIN I - Normal    Troponin I High Sensitivity 7     TROPONIN I - Normal    Troponin I High Sensitivity 8     CBC WITH PLATELETS AND DIFFERENTIAL    WBC Count 6.0      RBC Count 4.69      Hemoglobin 14.5      Hematocrit 43.0      MCV 92      MCH 30.9      MCHC 33.7      RDW 12.0      Platelet Count 243      % Neutrophils 63      % Lymphocytes 27      % Monocytes 8      % Eosinophils 1      % Basophils 1      % Immature Granulocytes 0      NRBCs per 100 WBC 0      Absolute Neutrophils 3.8      Absolute Lymphocytes 1.6      Absolute Monocytes 0.5      Absolute Eosinophils 0.1      Absolute Basophils 0.0      Absolute Immature Granulocytes 0.0      Absolute NRBCs 0.0        Emergency Department Course:  Reviewed:  I reviewed nursing notes,  vitals, past medical history, Care Everywhere and MIIC    Assessments:  1802 I obtained history and examined the patient as noted above.   1858 I rechecked the patient and explained findings.   2101 I rechecked the patient, who is comfortable with discharge.     Interventions:  1923 Aspirin 162 mg, PO    Disposition:  The patient was discharged to home.     Impression & Plan     Medical Decision Making:  Jeremy Lainez is a 45 year old male presented to the Emergency Department with a complaint of chest pressure. Fortunately the workup in the ED has been unremarkable occluding unremarkable EKG and negative troponin x2. I considered other possible causes of chest pain including PE, infection, pneumothorax, aortic dissection, and even more benign causes such as reflux and esophageal motility issues. The physical exam, laboratory, and radiological findings listed above make life threatening conditions less likely.  Given patient's previous history of MI and stent placement I did discuss hospital observation versus close outpatient follow-up and patient is comfortable with close outpatient follow-up; I believe this to be reasonable.  Patient does not have exertional symptoms, chest pain, or symptoms similar to previous MI.  Patient does note increased anxiety which may be contributing factor to his presentation today.  I did arrange for outpatient stress testing and recommended close follow-up with his cardiologist.  Return precautions were discussed.  Patient discharged home.  Patient asymptomatic at time of discharge.    Diagnosis:    ICD-10-CM    1. Chest pressure  R07.89 Exercise Stress Echocardiogram     Scribe Disclosure:  IRylie, am serving as a scribe at 5:59 PM on 12/25/2021 to document services personally performed by Sudeep Ann DO based on my observations and the provider's statements to me.      Sudeep Ann DO  12/25/21 [id number]

## 2021-12-27 LAB
ATRIAL RATE - MUSE: 88 BPM
DIASTOLIC BLOOD PRESSURE - MUSE: NORMAL MMHG
INTERPRETATION ECG - MUSE: NORMAL
P AXIS - MUSE: 65 DEGREES
PR INTERVAL - MUSE: 150 MS
QRS DURATION - MUSE: 88 MS
QT - MUSE: 358 MS
QTC - MUSE: 433 MS
R AXIS - MUSE: 0 DEGREES
SYSTOLIC BLOOD PRESSURE - MUSE: NORMAL MMHG
T AXIS - MUSE: 38 DEGREES
VENTRICULAR RATE- MUSE: 88 BPM

## 2021-12-28 DIAGNOSIS — I21.02 ACUTE ST ELEVATION MYOCARDIAL INFARCTION (STEMI) INVOLVING LEFT ANTERIOR DESCENDING (LAD) CORONARY ARTERY (H): Primary | ICD-10-CM

## 2021-12-28 DIAGNOSIS — I21.4 NSTEMI (NON-ST ELEVATED MYOCARDIAL INFARCTION) (H): ICD-10-CM

## 2021-12-28 RX ORDER — ISOSORBIDE MONONITRATE 30 MG/1
45 TABLET, EXTENDED RELEASE ORAL DAILY
Qty: 135 TABLET | Refills: 0 | Status: SHIPPED | OUTPATIENT
Start: 2021-12-28 | End: 2022-03-29

## 2021-12-28 NOTE — TELEPHONE ENCOUNTER
Received refill request for:  Imdur  Last OV was: 11/18/2020 with ALIX Rivera  Labs/EKG: na  F/U scheduled: 2/25/2022 with Dr. Lopez  New script sent to: [facility]    **Reviewed with Dr. Lopez.  Ok to refill Imdur.    KMortimer RN

## 2021-12-29 ENCOUNTER — HOSPITAL ENCOUNTER (OUTPATIENT)
Dept: CARDIOLOGY | Facility: CLINIC | Age: 46
Discharge: HOME | End: 2021-12-29
Attending: EMERGENCY MEDICINE | Admitting: EMERGENCY MEDICINE
Payer: COMMERCIAL

## 2021-12-29 DIAGNOSIS — R07.89 CHEST PRESSURE: ICD-10-CM

## 2021-12-29 PROCEDURE — 255N000002 HC RX 255 OP 636: Performed by: EMERGENCY MEDICINE

## 2021-12-29 PROCEDURE — 93325 DOPPLER ECHO COLOR FLOW MAPG: CPT | Mod: 26 | Performed by: INTERNAL MEDICINE

## 2021-12-29 PROCEDURE — C8928 TTE W OR W/O FOL W/CON,STRES: HCPCS

## 2021-12-29 PROCEDURE — 93325 DOPPLER ECHO COLOR FLOW MAPG: CPT | Mod: TC

## 2021-12-29 PROCEDURE — 93321 DOPPLER ECHO F-UP/LMTD STD: CPT | Mod: 26 | Performed by: INTERNAL MEDICINE

## 2021-12-29 PROCEDURE — 93018 CV STRESS TEST I&R ONLY: CPT | Performed by: INTERNAL MEDICINE

## 2021-12-29 PROCEDURE — 93016 CV STRESS TEST SUPVJ ONLY: CPT | Performed by: INTERNAL MEDICINE

## 2021-12-29 PROCEDURE — 93350 STRESS TTE ONLY: CPT | Mod: 26 | Performed by: INTERNAL MEDICINE

## 2021-12-29 RX ADMIN — HUMAN ALBUMIN MICROSPHERES AND PERFLUTREN 9 ML: 10; .22 INJECTION, SOLUTION INTRAVENOUS at 11:30

## 2022-01-07 ENCOUNTER — IMMUNIZATION (OUTPATIENT)
Dept: NURSING | Facility: CLINIC | Age: 47
End: 2022-01-07
Payer: COMMERCIAL

## 2022-01-07 PROCEDURE — 0004A PR COVID VAC PFIZER DIL RECON 30 MCG/0.3 ML IM: CPT

## 2022-01-07 PROCEDURE — 91300 PR COVID VAC PFIZER DIL RECON 30 MCG/0.3 ML IM: CPT

## 2022-01-08 ENCOUNTER — HEALTH MAINTENANCE LETTER (OUTPATIENT)
Age: 47
End: 2022-01-08

## 2022-02-02 ENCOUNTER — TELEPHONE (OUTPATIENT)
Dept: CARDIOLOGY | Facility: CLINIC | Age: 47
End: 2022-02-02
Payer: COMMERCIAL

## 2022-02-02 NOTE — TELEPHONE ENCOUNTER
----- Message from Zarina Baron RN sent at 2/2/2022  2:19 PM CST -----  Regarding: Atorvastatin  Hi,   [facility] is requesting a refill of Atorvastatin. It is not on his med list. Overdue for visit. Please fill if appropriate.   Thanks,   Zarina

## 2022-02-02 NOTE — TELEPHONE ENCOUNTER
Last visit 11/18/20 with SHASTA Rivera. Pt on Atorvastatin, plan for follow up in 1 year with repeat FLP/ALT. Rx was sent for Atorvastatin at that visit, but then was also discontinued. No notes documenting that it was discontinued. I believe this was an error. Pt has follow up arranged with Dr. Lopez with labs later this month. Messaged pt via [facility] to see if he has been taking Atorvastatin.     Ciera Gar RN, BSN  02/02/22 at 2:37 PM     Future Appointments   Date Time Provider Department Center   2/21/2022  8:00 AM [facility]   2/25/2022  1:45 PM Ravindra Lopez MD [facility]

## 2022-02-07 NOTE — TELEPHONE ENCOUNTER
Attempted to call pt to review refill for Atorvastatin. No answer, LVM asking for call to discuss.     Ciera Gar RN, BSN  02/07/22 at 4:22 PM

## 2022-02-15 NOTE — TELEPHONE ENCOUNTER
Have made multiple attempts to reach pt to review refill request for Atorvastatin. Pt has not returned call. Pt has upcoming visit with Dr. Lopez. Rx to be addressed at that visit.     Future Appointments   Date Time Provider Department Center   2/21/2022  8:00 AM [facility]   2/25/2022  1:45 PM Ravindra Lopez MD [facility]        Ciera Gar RN, BSN  02/15/22 at 1:20 PM

## 2022-02-21 ENCOUNTER — LAB (OUTPATIENT)
Dept: LAB | Facility: CLINIC | Age: 47
End: 2022-02-21
Payer: COMMERCIAL

## 2022-02-21 DIAGNOSIS — I21.4 NSTEMI (NON-ST ELEVATED MYOCARDIAL INFARCTION) (H): ICD-10-CM

## 2022-02-21 DIAGNOSIS — I21.4 NSTEMI (NON-ST ELEVATED MYOCARDIAL INFARCTION) (H): Primary | ICD-10-CM

## 2022-02-21 LAB
ALT SERPL W P-5'-P-CCNC: 38 U/L (ref 0–70)
CHOLEST SERPL-MCNC: 126 MG/DL
FASTING STATUS PATIENT QL REPORTED: YES
HDLC SERPL-MCNC: 48 MG/DL
LDLC SERPL CALC-MCNC: 51 MG/DL
NONHDLC SERPL-MCNC: 78 MG/DL
TRIGL SERPL-MCNC: 137 MG/DL

## 2022-02-21 PROCEDURE — 80061 LIPID PANEL: CPT | Performed by: PHYSICIAN ASSISTANT

## 2022-02-21 PROCEDURE — 36415 COLL VENOUS BLD VENIPUNCTURE: CPT | Performed by: PHYSICIAN ASSISTANT

## 2022-02-21 PROCEDURE — 84460 ALANINE AMINO (ALT) (SGPT): CPT | Performed by: PHYSICIAN ASSISTANT

## 2022-02-25 ENCOUNTER — OFFICE VISIT (OUTPATIENT)
Dept: CARDIOLOGY | Facility: CLINIC | Age: 47
End: 2022-02-25
Payer: COMMERCIAL

## 2022-02-25 VITALS
SYSTOLIC BLOOD PRESSURE: 122 MMHG | HEIGHT: 74 IN | WEIGHT: 198.2 LBS | HEART RATE: 90 BPM | OXYGEN SATURATION: 99 % | DIASTOLIC BLOOD PRESSURE: 72 MMHG | BODY MASS INDEX: 25.44 KG/M2

## 2022-02-25 DIAGNOSIS — I21.4 NSTEMI (NON-ST ELEVATED MYOCARDIAL INFARCTION) (H): Primary | ICD-10-CM

## 2022-02-25 PROCEDURE — 99213 OFFICE O/P EST LOW 20 MIN: CPT | Performed by: INTERNAL MEDICINE

## 2022-02-25 RX ORDER — ATORVASTATIN CALCIUM 40 MG/1
40 TABLET, FILM COATED ORAL DAILY
Qty: 90 TABLET | Refills: 11 | Status: SHIPPED | OUTPATIENT
Start: 2022-02-25 | End: 2023-03-02

## 2022-02-25 RX ORDER — ATORVASTATIN CALCIUM 40 MG/1
40 TABLET, FILM COATED ORAL DAILY
COMMUNITY
End: 2022-02-25

## 2022-02-25 NOTE — LETTER
"2/25/2022    Physician No Ref-Primary  No address on file    RE: Jeremy Lainez       Dear Colleague,     I had the pleasure of seeing Jeremy Lainez in the [facility].  Cardiology Progress Note          Assessment and Plan:       1. Coronary artery disease, stable by symptoms    Refilled atorvastatin.  Follow-up in 2 years.    20 minutes was spent with the patient, precharting and reviewing tests as well as post visit charting all done today..    This note was transcribed using electronic voice recognition software and there may be typographical errors present.                    Interval History:     The patient is a very pleasant 46 year old whom I have seen previously for coronary artery disease status post PCI of the LAD 2018.  He is currently asymptomatic.  He had negative stress testing in 2021.                     Review of Systems:     Review of Systems:  Skin:  not assessed     Eyes:  not assessed    ENT:  not assessed    Respiratory:  Negative    Cardiovascular:    fatigue;Positive for  Gastroenterology: Negative    Genitourinary:  not assessed    Musculoskeletal:  not assessed    Neurologic:  Negative    Psychiatric:  not assessed    Heme/Lymph/Imm:  not assessed    Endocrine:  Negative                Physical Exam:     Vitals: /72 (BP Location: Right arm, Patient Position: Sitting, Cuff Size: Adult Regular)   Pulse 90   Ht 1.88 m (6' 2\")   Wt 89.9 kg (198 lb 3.2 oz)   SpO2 99%   BMI 25.45 kg/m    Constitutional:  cooperative, alert and oriented, well developed, well nourished, in no acute distress        Skin:  warm and dry to the touch        Head:  normocephalic, no masses or lesions        Eyes:  pupils equal and round;conjunctivae and lids unremarkable;sclera white;no xanthalasma        Chest:  normal breath sounds, clear to auscultation, normal A-P diameter, normal symmetry, normal respiratory excursion, no use of accessory muscles        Cardiac: regular rhythm;no " murmurs, gallops or rubs detected;normal S1 and S2                  Extremities and Back:  no deformities, clubbing, cyanosis, erythema observed;no edema;no spinal abnormalities noted;normal muscle strength and tone        Neurological:  no gross motor deficits;affect appropriate                 Medications:     Current Outpatient Medications   Medication Sig Dispense Refill     aspirin 81 MG EC tablet Take 1 tablet (81 mg) by mouth daily       atorvastatin (LIPITOR) 40 MG tablet Take 1 tablet (40 mg) by mouth daily 90 tablet 11     isosorbide mononitrate (IMDUR) 30 MG 24 hr tablet Take 1.5 tablets (45 mg) by mouth daily 135 tablet 0                Data:   All laboratory data reviewed  No results found for this or any previous visit (from the past 24 hour(s)).    All laboratory data reviewed  Lab Results   Component Value Date    CHOL 126 02/21/2022    CHOL 132 11/20/2019     Lab Results   Component Value Date    HDL 48 02/21/2022    HDL 53 11/20/2019     Lab Results   Component Value Date    LDL 51 02/21/2022    LDL 61 11/20/2019     Lab Results   Component Value Date    TRIG 137 02/21/2022    TRIG 88 11/20/2019     No results found for: CHOLHDLRATIO  No results found for: TSH  Last Basic Metabolic Panel:  Lab Results   Component Value Date     12/25/2021     11/20/2019      Lab Results   Component Value Date    POTASSIUM 3.5 12/25/2021    POTASSIUM 3.6 11/20/2019     Lab Results   Component Value Date    CHLORIDE 105 12/25/2021    CHLORIDE 104 11/20/2019     Lab Results   Component Value Date    MARIZA 8.6 12/25/2021    MARIZA 9.6 11/20/2019     Lab Results   Component Value Date    CO2 29 12/25/2021    CO2 28 11/20/2019     Lab Results   Component Value Date    BUN 15 12/25/2021    BUN 12 11/20/2019     Lab Results   Component Value Date    CR 0.90 12/25/2021    CR 1.03 11/20/2019     Lab Results   Component Value Date     12/25/2021     11/20/2019     Lab Results   Component Value Date    WBC  6.0 12/25/2021    WBC 8.4 06/12/2018     Lab Results   Component Value Date    RBC 4.69 12/25/2021    RBC 4.91 06/12/2018     Lab Results   Component Value Date    HGB 14.5 12/25/2021    HGB 15.8 06/12/2018     Lab Results   Component Value Date    HCT 43.0 12/25/2021    HCT 45.0 06/12/2018     Lab Results   Component Value Date    MCV 92 12/25/2021    MCV 92 06/12/2018     Lab Results   Component Value Date    MCH 30.9 12/25/2021    MCH 32.2 06/12/2018     Lab Results   Component Value Date    MCHC 33.7 12/25/2021    MCHC 35.1 06/12/2018     Lab Results   Component Value Date    RDW 12.0 12/25/2021    RDW 12.1 06/12/2018     Lab Results   Component Value Date     12/25/2021     06/12/2018                   Thank you for allowing me to participate in the care of your patient.      Sincerely,     Ravindra Lopez MD     [facility]  cc:   No referring provider defined for this encounter.

## 2022-02-25 NOTE — PROGRESS NOTES
"Cardiology Progress Note          Assessment and Plan:       1. Coronary artery disease, stable by symptoms    Refilled atorvastatin.  Follow-up in 2 years.    20 minutes was spent with the patient, precharting and reviewing tests as well as post visit charting all done today..    This note was transcribed using electronic voice recognition software and there may be typographical errors present.                    Interval History:     The patient is a very pleasant 46 year old whom I have seen previously for coronary artery disease status post PCI of the LAD 2018.  He is currently asymptomatic.  He had negative stress testing in 2021.                     Review of Systems:     Review of Systems:  Skin:  not assessed     Eyes:  not assessed    ENT:  not assessed    Respiratory:  Negative    Cardiovascular:    fatigue;Positive for  Gastroenterology: Negative    Genitourinary:  not assessed    Musculoskeletal:  not assessed    Neurologic:  Negative    Psychiatric:  not assessed    Heme/Lymph/Imm:  not assessed    Endocrine:  Negative                Physical Exam:     Vitals: /72 (BP Location: Right arm, Patient Position: Sitting, Cuff Size: Adult Regular)   Pulse 90   Ht 1.88 m (6' 2\")   Wt 89.9 kg (198 lb 3.2 oz)   SpO2 99%   BMI 25.45 kg/m    Constitutional:  cooperative, alert and oriented, well developed, well nourished, in no acute distress        Skin:  warm and dry to the touch        Head:  normocephalic, no masses or lesions        Eyes:  pupils equal and round;conjunctivae and lids unremarkable;sclera white;no xanthalasma        Chest:  normal breath sounds, clear to auscultation, normal A-P diameter, normal symmetry, normal respiratory excursion, no use of accessory muscles        Cardiac: regular rhythm;no murmurs, gallops or rubs detected;normal S1 and S2                  Extremities and Back:  no deformities, clubbing, cyanosis, erythema observed;no edema;no spinal abnormalities noted;normal " muscle strength and tone        Neurological:  no gross motor deficits;affect appropriate                 Medications:     Current Outpatient Medications   Medication Sig Dispense Refill     aspirin 81 MG EC tablet Take 1 tablet (81 mg) by mouth daily       atorvastatin (LIPITOR) 40 MG tablet Take 1 tablet (40 mg) by mouth daily 90 tablet 11     isosorbide mononitrate (IMDUR) 30 MG 24 hr tablet Take 1.5 tablets (45 mg) by mouth daily 135 tablet 0                Data:   All laboratory data reviewed  No results found for this or any previous visit (from the past 24 hour(s)).    All laboratory data reviewed  Lab Results   Component Value Date    CHOL 126 02/21/2022    CHOL 132 11/20/2019     Lab Results   Component Value Date    HDL 48 02/21/2022    HDL 53 11/20/2019     Lab Results   Component Value Date    LDL 51 02/21/2022    LDL 61 11/20/2019     Lab Results   Component Value Date    TRIG 137 02/21/2022    TRIG 88 11/20/2019     No results found for: CHOLHDLRATIO  No results found for: TSH  Last Basic Metabolic Panel:  Lab Results   Component Value Date     12/25/2021     11/20/2019      Lab Results   Component Value Date    POTASSIUM 3.5 12/25/2021    POTASSIUM 3.6 11/20/2019     Lab Results   Component Value Date    CHLORIDE 105 12/25/2021    CHLORIDE 104 11/20/2019     Lab Results   Component Value Date    MARIZA 8.6 12/25/2021    MARIZA 9.6 11/20/2019     Lab Results   Component Value Date    CO2 29 12/25/2021    CO2 28 11/20/2019     Lab Results   Component Value Date    BUN 15 12/25/2021    BUN 12 11/20/2019     Lab Results   Component Value Date    CR 0.90 12/25/2021    CR 1.03 11/20/2019     Lab Results   Component Value Date     12/25/2021     11/20/2019     Lab Results   Component Value Date    WBC 6.0 12/25/2021    WBC 8.4 06/12/2018     Lab Results   Component Value Date    RBC 4.69 12/25/2021    RBC 4.91 06/12/2018     Lab Results   Component Value Date    HGB 14.5 12/25/2021    HGB  15.8 06/12/2018     Lab Results   Component Value Date    HCT 43.0 12/25/2021    HCT 45.0 06/12/2018     Lab Results   Component Value Date    MCV 92 12/25/2021    MCV 92 06/12/2018     Lab Results   Component Value Date    MCH 30.9 12/25/2021    MCH 32.2 06/12/2018     Lab Results   Component Value Date    MCHC 33.7 12/25/2021    MCHC 35.1 06/12/2018     Lab Results   Component Value Date    RDW 12.0 12/25/2021    RDW 12.1 06/12/2018     Lab Results   Component Value Date     12/25/2021     06/12/2018

## 2022-02-25 NOTE — LETTER
Date:March 17, 2022      Patient was self referred, no letter generated. Do not send.        [facility] Health Information

## 2022-03-29 DIAGNOSIS — I21.4 NSTEMI (NON-ST ELEVATED MYOCARDIAL INFARCTION) (H): ICD-10-CM

## 2022-03-29 RX ORDER — ISOSORBIDE MONONITRATE 30 MG/1
45 TABLET, EXTENDED RELEASE ORAL DAILY
Qty: 135 TABLET | Refills: 3 | Status: SHIPPED | OUTPATIENT
Start: 2022-03-29 | End: 2023-08-25

## 2022-11-20 ENCOUNTER — HEALTH MAINTENANCE LETTER (OUTPATIENT)
Age: 47
End: 2022-11-20

## 2023-03-02 DIAGNOSIS — I21.4 NSTEMI (NON-ST ELEVATED MYOCARDIAL INFARCTION) (H): ICD-10-CM

## 2023-03-02 RX ORDER — ATORVASTATIN CALCIUM 40 MG/1
40 TABLET, FILM COATED ORAL DAILY
Qty: 90 TABLET | Refills: 4 | Status: SHIPPED | OUTPATIENT
Start: 2023-03-02 | End: 2024-03-29

## 2023-04-15 ENCOUNTER — HEALTH MAINTENANCE LETTER (OUTPATIENT)
Age: 48
End: 2023-04-15

## 2023-08-25 DIAGNOSIS — I21.4 NSTEMI (NON-ST ELEVATED MYOCARDIAL INFARCTION) (H): ICD-10-CM

## 2023-08-25 RX ORDER — ISOSORBIDE MONONITRATE 30 MG/1
45 TABLET, EXTENDED RELEASE ORAL DAILY
Qty: 135 TABLET | Refills: 3 | Status: SHIPPED | OUTPATIENT
Start: 2023-08-25

## 2024-03-29 DIAGNOSIS — I21.4 NSTEMI (NON-ST ELEVATED MYOCARDIAL INFARCTION) (H): ICD-10-CM

## 2024-03-29 RX ORDER — ATORVASTATIN CALCIUM 40 MG/1
40 TABLET, FILM COATED ORAL DAILY
Qty: 90 TABLET | Refills: 0 | Status: SHIPPED | OUTPATIENT
Start: 2024-03-29 | End: 2024-07-02

## 2024-06-22 ENCOUNTER — HEALTH MAINTENANCE LETTER (OUTPATIENT)
Age: 49
End: 2024-06-22

## 2024-07-02 DIAGNOSIS — I21.4 NSTEMI (NON-ST ELEVATED MYOCARDIAL INFARCTION) (H): ICD-10-CM

## 2024-07-02 RX ORDER — ATORVASTATIN CALCIUM 40 MG/1
40 TABLET, FILM COATED ORAL DAILY
Qty: 90 TABLET | Refills: 0 | Status: SHIPPED | OUTPATIENT
Start: 2024-07-02

## 2024-10-15 DIAGNOSIS — I21.4 NSTEMI (NON-ST ELEVATED MYOCARDIAL INFARCTION) (H): ICD-10-CM

## 2024-10-15 RX ORDER — ISOSORBIDE MONONITRATE 30 MG/1
45 TABLET, EXTENDED RELEASE ORAL DAILY
Qty: 45 TABLET | Refills: 0 | Status: SHIPPED | OUTPATIENT
Start: 2024-10-15 | End: 2024-11-01

## 2024-10-15 NOTE — TELEPHONE ENCOUNTER
[facility] Cardiology Refill Guideline reviewed.  Medication meets criteria for refill. Patient has an appointment scheduled with Tye Bran for 11/1/24.

## 2024-11-01 ENCOUNTER — OFFICE VISIT (OUTPATIENT)
Dept: CARDIOLOGY | Facility: CLINIC | Age: 49
End: 2024-11-01
Payer: COMMERCIAL

## 2024-11-01 VITALS
BODY MASS INDEX: 24.77 KG/M2 | OXYGEN SATURATION: 97 % | WEIGHT: 193 LBS | SYSTOLIC BLOOD PRESSURE: 126 MMHG | HEART RATE: 109 BPM | HEIGHT: 74 IN | DIASTOLIC BLOOD PRESSURE: 88 MMHG

## 2024-11-01 DIAGNOSIS — I21.4 NSTEMI (NON-ST ELEVATED MYOCARDIAL INFARCTION) (H): Primary | ICD-10-CM

## 2024-11-01 PROCEDURE — 99214 OFFICE O/P EST MOD 30 MIN: CPT | Performed by: PHYSICIAN ASSISTANT

## 2024-11-01 RX ORDER — NITROGLYCERIN 0.4 MG/1
TABLET SUBLINGUAL
Qty: 25 TABLET | Refills: 1 | Status: SHIPPED | OUTPATIENT
Start: 2024-11-01

## 2024-11-01 RX ORDER — ATORVASTATIN CALCIUM 40 MG/1
40 TABLET, FILM COATED ORAL DAILY
Qty: 90 TABLET | Refills: 4 | Status: SHIPPED | OUTPATIENT
Start: 2024-11-01

## 2024-11-01 RX ORDER — ISOSORBIDE MONONITRATE 30 MG/1
30 TABLET, EXTENDED RELEASE ORAL DAILY
Qty: 90 TABLET | Refills: 4 | Status: SHIPPED | OUTPATIENT
Start: 2024-11-01

## 2024-11-01 NOTE — LETTER
2024    Physician No Ref-Primary  No address on file    RE: Jeremy Lainez       Dear Colleague,     I had the pleasure of seeing Jeremy Lainez in the [facility].      CARDIOLOGY CLINIC PROGRESS NOTE    DOS: 2024      Jeremy Lainez  : 1975, 48 year old  MRN: [id number]      Primary cardiologist: Dr. Lopez      History: 48 year old man with history of prior tobacco dependence, recreational marijuana use, CAD, s/p PCI 18, some coronary spasm.     He was admitted on 2018 with about 1 week history of intermittent substernal chest pain associated with diaphoresis.  He was sent from PMD office to [facility] ER due to abnormal ECG concerning for anterior T inversions.  Mild troponin elevation.  He was hypertensive. Cardiac catheterization 18 with 60% OM1 lesion and 40-50% p-mLAD lesion, negative FFR (0.84) across the latter.  He had recurrent chest discomfort over the weekend.  Echo 6/10/18 without definitive WMAs.     Repeat cath 18: 60% prox LAD with evidence of plaque rupture.  Acetylcholine provocation study with spasm in the prox LAD lesion without significant spasm elsewhere. Given the results and recurrence while on DAPT and medical therapy, he underwent 3.5 x 28 mm synergy KATLYN to prox LAD.   He had some bradycardia with addition of beta-blocker, so Imdur started.   Due to some sxs in follow up, Imdur was increased to 45 mg.      He presents today for follow up.   He has had no recurrence of his anginal pain.   He is now on Imdur 30 mg.   He is doing well.   He still refrains from smoking.   He does smoke some marijuana.         ROS:  Skin:  not assessed     Eyes:  not assessed    ENT:  not assessed    Respiratory:  Negative    Cardiovascular:    Positive for;palpitations;chest pain  Gastroenterology: not assessed    Genitourinary:  not assessed    Musculoskeletal:  not assessed    Neurologic:  not assessed    Psychiatric:  not assessed    Heme/Lymph/Imm:  " not assessed    Endocrine:  not assessed      PAST MEDICAL HISTORY:  Past Medical History:   Diagnosis Date     CAD (coronary artery disease)      History of tobacco abuse      HTN (hypertension)      Hyperlipidemia      Marijuana use      STEMI (ST elevation myocardial infarction) (H) 2018     CAD s/p KATLYN to LAD 18       PAST SURGICAL HISTORY:  Past Surgical History:   Procedure Laterality Date     CARDIAC CATHERIZATION  2018    KATLYN to LAD       SOCIAL HISTORY:  Social History     Socioeconomic History     Marital status:    Tobacco Use     Smoking status: Former     Current packs/day: 0.00     Types: Cigarettes     Quit date: 2018     Years since quittin.4     Smokeless tobacco: Never   Substance and Sexual Activity     Alcohol use: Yes     Comment: 1-2 drinks a night        FAMILY HISTORY:  No family history on file.    MEDS:   Current Outpatient Medications   Medication Sig Dispense Refill     aspirin 81 MG EC tablet Take 1 tablet (81 mg) by mouth daily       atorvastatin (LIPITOR) 40 MG tablet Take 1 tablet (40 mg) by mouth daily. 90 tablet 4     isosorbide mononitrate (IMDUR) 30 MG 24 hr tablet Take 1 tablet (30 mg) by mouth daily. 90 tablet 4     nitroGLYcerin (NITROSTAT) 0.4 MG sublingual tablet For chest pain place 1 tablet under the tongue every 5 minutes for 3 doses. If symptoms persist 5 minutes after 1st dose call 911. 25 tablet 1     No current facility-administered medications for this visit.       ALLERGIES: No Known Allergies    PHYSICAL EXAM:  Vitals: /88 (BP Location: Right arm, Patient Position: Sitting, Cuff Size: Adult Regular)   Pulse 109   Ht 1.88 m (6' 2\")   Wt 87.5 kg (193 lb)   SpO2 97%   BMI 24.78 kg/m    Constitutional:  cooperative, alert and oriented, well developed, well nourished, in no acute distress        Skin:  warm and dry to the touch        Head:  normocephalic, no masses or lesions        Eyes:           ENT:  no pallor or cyanosis  "       Neck:  JVP normal        Respiratory:  normal breath sounds, clear to auscultation, normal A-P diameter, normal symmetry, normal respiratory excursion, no use of accessory muscles        Cardiac: regular rhythm;no murmurs, gallops or rubs detected;normal S1 and S2                  GI:  BS normoactive        Vascular: pulses full and equal                                      Extremities and Musculoskeletal:  no deformities, clubbing, cyanosis, erythema observed;no edema;no spinal abnormalities noted;normal muscle strength and tone        Neurological:  no gross motor deficits;affect appropriate              LABS/DATA:  I reviewed the following:  Stress echo 12/29/21:  Interpretation Summary  This was a normal stress echocardiogram with no evidence of stress-induced  ischemia.        ASSESSMENT/PLAN:  STEMI  CAD  Vasospasm  - anterior ST elevation  - 6/8/18 urgent cath: 40-50% pLAD and 60% OM1 (small vessel)  LAD not found to be flow limiting per FFR (0.84).  No PCI  - developed recurrent CP & palpitations w/ associated ST elevations in V2-V5 w/ T-wave inversions  - ECHO 6/10/18 LVEF 55% possible Mild mid/high lateral, anterior RWMA. Though no definitive WMAs  - Repeat cath 6/11/18: 60% prox LAD. Acetylcholine provocation study with spasm in the prox LAD lesion without significant spasm elsewhere. Given the results and recurrent SINDI while on DAPT and medical therapy he underwent 3.5 x 28 mm synergy KATLYN to prox LAD.   - Continue ASA, Imdur 30 mg for spasm, atorvastatin 40 mg   - Continued smoking cessation   - Mediterranean style diet       HTN  - Controlled on Imdur 30 mg for BP and spasm.    - Could use Norvasc if needed in the future        Tobacco dependence  - quit at time of admit, though does still use marijuana        Statin started  - Atorvastatin 40 mg  - 2/2022: LDL 51  - FLP/ALT next visit         Meds refiled      Follow up:  2 years, sooner if concerns          Tye Bran PA-C          Thank  you for allowing me to participate in the care of your patient.      Sincerely,     Tye Bran PA-C     [facility]  cc:   Ravindra Lopez MD  [address]  [place] [id number]

## 2024-11-01 NOTE — PROGRESS NOTES
CARDIOLOGY CLINIC PROGRESS NOTE    DOS: 2024      Jeremy Lainez  : 1975, 48 year old  MRN: [id number]      Primary cardiologist: Dr. Lopez      History: 48 year old man with history of prior tobacco dependence, recreational marijuana use, CAD, s/p PCI 18, some coronary spasm.     He was admitted on 2018 with about 1 week history of intermittent substernal chest pain associated with diaphoresis.  He was sent from PMD office to [facility] ER due to abnormal ECG concerning for anterior T inversions.  Mild troponin elevation.  He was hypertensive. Cardiac catheterization 18 with 60% OM1 lesion and 40-50% p-mLAD lesion, negative FFR (0.84) across the latter.  He had recurrent chest discomfort over the weekend.  Echo 6/10/18 without definitive WMAs.     Repeat cath 18: 60% prox LAD with evidence of plaque rupture.  Acetylcholine provocation study with spasm in the prox LAD lesion without significant spasm elsewhere. Given the results and recurrence while on DAPT and medical therapy, he underwent 3.5 x 28 mm synergy KATLYN to prox LAD.   He had some bradycardia with addition of beta-blocker, so Imdur started.   Due to some sxs in follow up, Imdur was increased to 45 mg.      He presents today for follow up.   He has had no recurrence of his anginal pain.   He is now on Imdur 30 mg.   He is doing well.   He still refrains from smoking.   He does smoke some marijuana.         ROS:  Skin:  not assessed     Eyes:  not assessed    ENT:  not assessed    Respiratory:  Negative    Cardiovascular:    Positive for;palpitations;chest pain  Gastroenterology: not assessed    Genitourinary:  not assessed    Musculoskeletal:  not assessed    Neurologic:  not assessed    Psychiatric:  not assessed    Heme/Lymph/Imm:  not assessed    Endocrine:  not assessed      PAST MEDICAL HISTORY:  Past Medical History:   Diagnosis Date    CAD (coronary artery disease)     History of tobacco abuse     HTN  "(hypertension)     Hyperlipidemia     Marijuana use     STEMI (ST elevation myocardial infarction) (H) 2018     CAD s/p KATLYN to LAD 18       PAST SURGICAL HISTORY:  Past Surgical History:   Procedure Laterality Date    CARDIAC CATHERIZATION  2018    KATLYN to LAD       SOCIAL HISTORY:  Social History     Socioeconomic History    Marital status:    Tobacco Use    Smoking status: Former     Current packs/day: 0.00     Types: Cigarettes     Quit date: 2018     Years since quittin.4    Smokeless tobacco: Never   Substance and Sexual Activity    Alcohol use: Yes     Comment: 1-2 drinks a night        FAMILY HISTORY:  No family history on file.    MEDS:   Current Outpatient Medications   Medication Sig Dispense Refill    aspirin 81 MG EC tablet Take 1 tablet (81 mg) by mouth daily      atorvastatin (LIPITOR) 40 MG tablet Take 1 tablet (40 mg) by mouth daily. 90 tablet 4    isosorbide mononitrate (IMDUR) 30 MG 24 hr tablet Take 1 tablet (30 mg) by mouth daily. 90 tablet 4    nitroGLYcerin (NITROSTAT) 0.4 MG sublingual tablet For chest pain place 1 tablet under the tongue every 5 minutes for 3 doses. If symptoms persist 5 minutes after 1st dose call 911. 25 tablet 1     No current facility-administered medications for this visit.       ALLERGIES: No Known Allergies    PHYSICAL EXAM:  Vitals: /88 (BP Location: Right arm, Patient Position: Sitting, Cuff Size: Adult Regular)   Pulse 109   Ht 1.88 m (6' 2\")   Wt 87.5 kg (193 lb)   SpO2 97%   BMI 24.78 kg/m    Constitutional:  cooperative, alert and oriented, well developed, well nourished, in no acute distress        Skin:  warm and dry to the touch        Head:  normocephalic, no masses or lesions        Eyes:           ENT:  no pallor or cyanosis        Neck:  JVP normal        Respiratory:  normal breath sounds, clear to auscultation, normal A-P diameter, normal symmetry, normal respiratory excursion, no use of accessory muscles    "     Cardiac: regular rhythm;no murmurs, gallops or rubs detected;normal S1 and S2                  GI:  BS normoactive        Vascular: pulses full and equal                                      Extremities and Musculoskeletal:  no deformities, clubbing, cyanosis, erythema observed;no edema;no spinal abnormalities noted;normal muscle strength and tone        Neurological:  no gross motor deficits;affect appropriate              LABS/DATA:  I reviewed the following:  Stress echo 12/29/21:  Interpretation Summary  This was a normal stress echocardiogram with no evidence of stress-induced  ischemia.        ASSESSMENT/PLAN:  STEMI  CAD  Vasospasm  - anterior ST elevation  - 6/8/18 urgent cath: 40-50% pLAD and 60% OM1 (small vessel)  LAD not found to be flow limiting per FFR (0.84).  No PCI  - developed recurrent CP & palpitations w/ associated ST elevations in V2-V5 w/ T-wave inversions  - ECHO 6/10/18 LVEF 55% possible Mild mid/high lateral, anterior RWMA. Though no definitive WMAs  - Repeat cath 6/11/18: 60% prox LAD. Acetylcholine provocation study with spasm in the prox LAD lesion without significant spasm elsewhere. Given the results and recurrent SINDI while on DAPT and medical therapy he underwent 3.5 x 28 mm synergy KATLYN to prox LAD.   - Continue ASA, Imdur 30 mg for spasm, atorvastatin 40 mg   - Continued smoking cessation   - Mediterranean style diet       HTN  - Controlled on Imdur 30 mg for BP and spasm.    - Could use Norvasc if needed in the future        Tobacco dependence  - quit at time of admit, though does still use marijuana        Statin started  - Atorvastatin 40 mg  - 2/2022: LDL 51  - FLP/ALT next visit         Meds refiled      Follow up:  2 years, sooner if concerns          Tye Bran PA-C

## 2025-01-18 NOTE — PROGRESS NOTES
"Cardiology Progress Note  Marcela Madrigal, APRN          Assessment and Plan:   Admit (6/8) w/ 1 wk intermittent SSCP w/ assoc diaphoresis.  Sent from PMD to ER due to anterior ST elevation on EKG w/ mild troponin elevation.  HTN     PMH:  Tobacco dependence    STEMI:  -troponin peak 0.1/anterior ST elevation  -(6/8) urgent cath:  50% pLAD and 60% OM1(small vessel)  LAD not found to be flow limiting per FFR.  No PCI  -developed recurrent CP & palpitations w/ associated ST elevations in V2-V5 w/ T-wave inversions  -ECHO (6/10) LVEF 55% possible Mild mid/high lateral, anterior RWMA  -No pain currently with NTG drip  -plan for repeat coronary angiogram this am  -hemodynamically stable/trops trending down  -? Coronary spasm  -bblocker/ASA/Plavix/heparin    HTN:  -elevated DBP on admit  -now well controlled w/ Metoprolol    Tobacco dependence:  -quit at time of admit  -motivated to stay off cigarettes.    Statin started  -baseline LDL 71     -will need lipid panel in 2 months               Interval History:   No current CP  Denies SOB/palpitations/orthopnea                Medications:       aspirin  325 mg Oral Daily     atorvastatin  40 mg Oral Daily     clopidogrel  75 mg Oral Daily     metoprolol succinate  12.5 mg Oral Daily     sodium chloride (PF)  3 mL Intracatheter Q8H            Physical Exam:   Blood pressure 114/64, pulse 58, temperature 96.6  F (35.9  C), temperature source Oral, resp. rate 12, height 1.88 m (6' 2\"), weight 82.7 kg (182 lb 4.8 oz), SpO2 100 %.  Wt Readings from Last 3 Encounters:   06/08/18 82.7 kg (182 lb 4.8 oz)     I/O last 3 completed shifts:  In: 483 [P.O.:320; I.V.:163]  Out: -     CONST:  Alert and oriented  NAD  LUNGS:  CTA bilat  CARDIO:  RRR, s1, S2  No murmurs  ABD:  Soft  +BS  EXT:  No edema  2+DP bilat           Data:   TELE:  SR    CBC    Recent Labs  Lab 06/10/18  0620 06/08/18  1046   WBC 7.4 7.0   HGB 15.1 16.0    215       BMP    Recent Labs  Lab 06/08/18  1046    "   POTASSIUM 3.7   CHLORIDE 108   MARIZA 9.0   CO2 29   BUN 10   CR 0.86   GLC 90     Recent Labs   Lab Test  06/09/18   0617   CHOL  149   HDL  46   LDL  71   TRIG  160*       TROP  Lab Results   Component Value Date    TROPI 0.061 (H) 06/10/2018    TROPI 0.071 (H) 06/10/2018    TROPI 0.079 (H) 06/10/2018    TROPI 0.097 (H) 06/10/2018    TROPI 0.155 (HH) 06/09/2018       BNP  No results for input(s): NTBNPI, NTBNP in the last 168 hours.           negative

## 2025-07-12 ENCOUNTER — HEALTH MAINTENANCE LETTER (OUTPATIENT)
Age: 50
End: 2025-07-12